# Patient Record
Sex: MALE | Race: WHITE | NOT HISPANIC OR LATINO | Employment: OTHER | URBAN - METROPOLITAN AREA
[De-identification: names, ages, dates, MRNs, and addresses within clinical notes are randomized per-mention and may not be internally consistent; named-entity substitution may affect disease eponyms.]

---

## 2023-07-13 ENCOUNTER — OFFICE VISIT (OUTPATIENT)
Dept: PODIATRY | Facility: CLINIC | Age: 68
End: 2023-07-13
Payer: MEDICARE

## 2023-07-13 VITALS
SYSTOLIC BLOOD PRESSURE: 138 MMHG | HEIGHT: 70 IN | WEIGHT: 300.19 LBS | HEART RATE: 82 BPM | BODY MASS INDEX: 42.97 KG/M2 | DIASTOLIC BLOOD PRESSURE: 83 MMHG

## 2023-07-13 DIAGNOSIS — M79.672 LEFT FOOT PAIN: ICD-10-CM

## 2023-07-13 DIAGNOSIS — R26.2 DIFFICULTY WALKING: ICD-10-CM

## 2023-07-13 DIAGNOSIS — L84 CORN OR CALLUS: ICD-10-CM

## 2023-07-13 DIAGNOSIS — L97.521 ULCER OF LEFT FOOT, LIMITED TO BREAKDOWN OF SKIN: Primary | ICD-10-CM

## 2023-07-13 PROCEDURE — 99203 OFFICE O/P NEW LOW 30 MIN: CPT | Mod: 25,S$GLB,, | Performed by: PODIATRIST

## 2023-07-13 PROCEDURE — 99203 PR OFFICE/OUTPT VISIT, NEW, LEVL III, 30-44 MIN: ICD-10-PCS | Mod: 25,S$GLB,, | Performed by: PODIATRIST

## 2023-07-13 PROCEDURE — 97597 WOUND DEBRIDEMENT: ICD-10-PCS | Mod: S$GLB,,, | Performed by: PODIATRIST

## 2023-07-13 PROCEDURE — 97597 DBRDMT OPN WND 1ST 20 CM/<: CPT | Mod: S$GLB,,, | Performed by: PODIATRIST

## 2023-07-13 RX ORDER — IRBESARTAN AND HYDROCHLOROTHIAZIDE 150; 12.5 MG/1; MG/1
1 TABLET, FILM COATED ORAL DAILY
COMMUNITY

## 2023-07-13 RX ORDER — LEVOTHYROXINE SODIUM 150 UG/1
150 TABLET ORAL
COMMUNITY

## 2023-07-13 RX ORDER — VIT C/E/ZN/COPPR/LUTEIN/ZEAXAN 250MG-90MG
CAPSULE ORAL
COMMUNITY

## 2023-07-13 RX ORDER — LOSARTAN POTASSIUM AND HYDROCHLOROTHIAZIDE 12.5; 5 MG/1; MG/1
1 TABLET ORAL DAILY
COMMUNITY

## 2023-07-13 RX ORDER — OMEPRAZOLE 40 MG/1
1 CAPSULE, DELAYED RELEASE ORAL EVERY MORNING
COMMUNITY
Start: 2023-06-12

## 2023-07-19 NOTE — PROGRESS NOTES
Subjective:     Patient ID: Julian Enciso is a 68 y.o. male.    Chief Complaint: Foot Ulcer    Julian is a 68 y.o. male who presents to the clinic for evaluation and treatment of high risk feet. Julian has a past medical history of Hypertension, Hypothyroidism, and Thyroid disease. The patient's chief complaint is foot ulcer, left 1st digit.         Current shoe gear:  Affected Foot: Slip-on shoes     Unaffected Foot: Slip-on shoes    History of Trauma: negative  Sign of Infection: none    No results found for: HGBA1C    Review of Systems   Constitutional: Negative for chills and fever.   Cardiovascular:  Negative for chest pain and leg swelling.   Respiratory:  Negative for cough and shortness of breath.    Gastrointestinal:  Negative for diarrhea, nausea and vomiting.      Objective:     Physical Exam  Vitals reviewed.   Constitutional:       General: He is not in acute distress.     Appearance: Normal appearance. He is not ill-appearing.   HENT:      Head: Normocephalic.      Nose: Nose normal.   Cardiovascular:      Rate and Rhythm: Normal rate.   Pulmonary:      Effort: Pulmonary effort is normal. No respiratory distress.   Skin:     Capillary Refill: Capillary refill takes 2 to 3 seconds.   Neurological:      Mental Status: He is alert and oriented to person, place, and time.   Psychiatric:         Mood and Affect: Mood normal.         Behavior: Behavior normal.         Thought Content: Thought content normal.     Dermatologic:  Ulceration noted to left 1st digit/ MPJ measuring 2.0 x 2.0 x 0.1 cm as evidence of maceration and hyperkeratotic perimeter but no signs of infection, preulcerative hyperkeratotic skin lesion noted to the right 1st MPJ and IPJ, no rashes noted bilateral foot, no interdigital maceration noted bilateral foot   Vascular: DP and PT pulses palpable 1/4 bilateral foot, capillary fill time less than 3 seconds to distal digits bilateral foot   Musculoskeletal:  5/5 muscle strength noted  bilateral foot, ankle joint range of motion is reduced without pain, pain and tenderness with palpation of the left 1st MPJ ulceration  Neurologic:  Protective and  light touch sensation intact bilateral lower extremity    Assessment:      Encounter Diagnoses   Name Primary?    Ulcer of left foot, limited to breakdown of skin Yes    Corn or callus     Left foot pain     Difficulty walking      Plan:     Julian was seen today for foot ulcer.    Diagnoses and all orders for this visit:    Ulcer of left foot, limited to breakdown of skin  -     Wound Debridement    Corn or callus    Left foot pain    Difficulty walking      I counseled the patient on his conditions, their implications and medical management.        1. Patient was examined and evaluated.    2. Discussed with patient etiology of ulcer formation.  Patient was advised to continue with daily wound care to the ulcer.  Patient will decrease the amount of moisture to the left foot.  Patient was dispensed Betadine for cleansing and painting of the area.  Patient was advised to proceed with a dry sterile bandage on a daily basis.  Wound Debridement    Date/Time: 7/13/2023 1:00 PM  Performed by: Viry Anthony DPM  Authorized by: Viry Anthony DPM     Consent Done?:  Yes (Verbal)    Wound Details:    Location:  Left foot    Location:  Left 1st Metatarsal Head       Length (cm):  2       Area (sq cm):  4       Width (cm):  2       Percent Debrided (%):  100       Depth (cm):  0.1       Total Area Debrided (sq cm):  4    Depth of debridement:  Epidermis/Dermis    Devitalized tissue debrided:  Biofilm and Callus    Instruments:  Blade  Bleeding:  Minimal  Hemostasis Achieved: Yes  Method Used:  Pressure  Patient tolerance:  Patient tolerated the procedure well with no immediate complications  1st Wound Pain Assessment: 2    3. Discussed with patient etiology of callus formation.  Patient was advised to decrease the amount of barefoot walking and limit  use of flip-flops.  Patient was advised to perform safe debridement at home with a nail file, emery board, or pumice stone.  Patient was advised to apply ointment/moisturizer to callus areas only  4. Patient was advised to report any constitutional symptoms to his primary care physician, local urgent care center, or [facility].    5. Patient will follow-up in 1 week or p.r.n. for complaints

## 2023-07-21 ENCOUNTER — OFFICE VISIT (OUTPATIENT)
Dept: PODIATRY | Facility: CLINIC | Age: 68
End: 2023-07-21
Payer: MEDICARE

## 2023-07-21 VITALS
DIASTOLIC BLOOD PRESSURE: 84 MMHG | WEIGHT: 300 LBS | HEIGHT: 70 IN | HEART RATE: 64 BPM | BODY MASS INDEX: 42.95 KG/M2 | SYSTOLIC BLOOD PRESSURE: 142 MMHG

## 2023-07-21 DIAGNOSIS — M79.672 LEFT FOOT PAIN: ICD-10-CM

## 2023-07-21 DIAGNOSIS — L97.521 ULCER OF LEFT FOOT, LIMITED TO BREAKDOWN OF SKIN: Primary | ICD-10-CM

## 2023-07-21 DIAGNOSIS — R26.2 DIFFICULTY WALKING: ICD-10-CM

## 2023-07-21 PROCEDURE — 99499 NO LOS: ICD-10-PCS | Mod: S$GLB,,, | Performed by: PODIATRIST

## 2023-07-21 PROCEDURE — 97597 DBRDMT OPN WND 1ST 20 CM/<: CPT | Mod: S$GLB,,, | Performed by: PODIATRIST

## 2023-07-21 PROCEDURE — 99499 UNLISTED E&M SERVICE: CPT | Mod: S$GLB,,, | Performed by: PODIATRIST

## 2023-07-21 PROCEDURE — 97597 WOUND DEBRIDEMENT: ICD-10-PCS | Mod: S$GLB,,, | Performed by: PODIATRIST

## 2023-07-21 RX ORDER — ERYTHROMYCIN 5 MG/G
OINTMENT OPHTHALMIC NIGHTLY
COMMUNITY
Start: 2023-04-12

## 2023-07-21 NOTE — PROGRESS NOTES
Subjective:     Patient ID: Julian Enciso is a 68 y.o. male.    Chief Complaint: Foot Ulcer    Julian is a 68 y.o. male who presents to the clinic for evaluation and treatment of high risk feet. Julian has a past medical history of Hypertension, Hypothyroidism, and Thyroid disease. The patient's chief complaint is foot ulcer, left 1st MPJ  .     PCP: Primary Doctor No        Current shoe gear:  Affected Foot: Tennis shoes     Unaffected Foot: Tennis shoes    History of Trauma: negative  Sign of Infection: none    No results found for: HGBA1C    Review of Systems   Constitutional: Negative for chills and fever.   Cardiovascular:  Negative for chest pain and leg swelling.   Respiratory:  Negative for cough and shortness of breath.    Gastrointestinal:  Negative for diarrhea, nausea and vomiting.      Objective:     Physical Exam  Vitals reviewed.   Constitutional:       General: He is not in acute distress.     Appearance: Normal appearance. He is not ill-appearing.   HENT:      Head: Normocephalic.      Nose: Nose normal.   Cardiovascular:      Rate and Rhythm: Normal rate.   Pulmonary:      Effort: Pulmonary effort is normal. No respiratory distress.   Skin:     Capillary Refill: Capillary refill takes 2 to 3 seconds.   Neurological:      Mental Status: He is alert and oriented to person, place, and time.   Psychiatric:         Mood and Affect: Mood normal.         Behavior: Behavior normal.         Thought Content: Thought content normal.        Dermatologic:  Ulceration noted to left 1st digit/ MPJ measuring 2.0 x 2.0 x 0.1 cm as evidence of maceration and hyperkeratotic perimeter but no signs of infection, preulcerative hyperkeratotic skin lesion noted to the right 1st MPJ and IPJ, no rashes noted bilateral foot, no interdigital maceration noted bilateral foot   Vascular: DP and PT pulses palpable 1/4 bilateral foot, capillary fill time less than 3 seconds to distal digits bilateral foot   Musculoskeletal:   5/5 muscle strength noted bilateral foot, ankle joint range of motion is reduced without pain, pain and tenderness with palpation of the left 1st MPJ ulceration  Neurologic:  Protective and  light touch sensation intact bilateral lower extremity    Assessment:      Encounter Diagnoses   Name Primary?    Ulcer of left foot, limited to breakdown of skin Yes    Left foot pain     Difficulty walking      Plan:     Julian was seen today for foot ulcer.    Diagnoses and all orders for this visit:    Ulcer of left foot, limited to breakdown of skin  -     Wound Debridement    Left foot pain  -     Wound Debridement    Difficulty walking  -     Wound Debridement      I counseled the patient on his conditions, their implications and medical management.        1. Patient was examined and evaluated.    2. Discussed with patient etiology of ulcer formation.  Patient was advised to continue with daily wound care to the ulcer.  Patient will decrease the amount of moisture to the left foot.  Patient was dispensed Betadine for cleansing and painting of the area.  Patient was advised to proceed with a dry sterile bandage on a daily basis.  Wound Debridement    Date/Time: 7/21/2023 11:15 AM  Performed by: Viry Anthony DPM  Authorized by: Viry Anthony DPM     Consent Done?:  Yes (Verbal)    Wound Details:    Location:  Left foot    Location:  Left 1st Metatarsal Head       Length (cm):  2       Area (sq cm):  4       Width (cm):  2       Percent Debrided (%):  100       Depth (cm):  0.1       Total Area Debrided (sq cm):  4    Depth of debridement:  Epidermis/Dermis    Devitalized tissue debrided:  Biofilm, Callus and Slough    Instruments:  Blade  Bleeding:  Moderate  Hemostasis Achieved: Yes  Method Used:  Pressure  Patient tolerance:  Patient tolerated the procedure well with no immediate complications  1st Wound Pain Assessment: 0    3. Discussed with patient etiology of callus formation.  Patient was advised to  decrease the amount of barefoot walking and limit use of flip-flops.  Patient was advised to perform safe debridement at home with a nail file, emery board, or pumice stone.  Patient was advised to apply ointment/moisturizer to callus areas only  4. Patient was advised to report any constitutional symptoms to his primary care physician, local urgent care center, or [facility].    5. Patient will follow-up in 1 week or p.r.n. for complaints

## 2023-07-28 ENCOUNTER — OFFICE VISIT (OUTPATIENT)
Dept: PODIATRY | Facility: CLINIC | Age: 68
End: 2023-07-28
Payer: MEDICARE

## 2023-07-28 VITALS
SYSTOLIC BLOOD PRESSURE: 157 MMHG | BODY MASS INDEX: 42.95 KG/M2 | WEIGHT: 300 LBS | HEART RATE: 61 BPM | DIASTOLIC BLOOD PRESSURE: 87 MMHG | HEIGHT: 70 IN

## 2023-07-28 DIAGNOSIS — R26.2 DIFFICULTY WALKING: ICD-10-CM

## 2023-07-28 DIAGNOSIS — L97.521 ULCER OF LEFT FOOT, LIMITED TO BREAKDOWN OF SKIN: Primary | ICD-10-CM

## 2023-07-28 DIAGNOSIS — L84 CORN OR CALLUS: ICD-10-CM

## 2023-07-28 DIAGNOSIS — M79.672 LEFT FOOT PAIN: ICD-10-CM

## 2023-07-28 PROCEDURE — 99213 PR OFFICE/OUTPT VISIT, EST, LEVL III, 20-29 MIN: ICD-10-PCS | Mod: 25,S$GLB,, | Performed by: PODIATRIST

## 2023-07-28 PROCEDURE — 99213 OFFICE O/P EST LOW 20 MIN: CPT | Mod: 25,S$GLB,, | Performed by: PODIATRIST

## 2023-07-28 PROCEDURE — 97597 DBRDMT OPN WND 1ST 20 CM/<: CPT | Mod: S$GLB,,, | Performed by: PODIATRIST

## 2023-07-28 PROCEDURE — 97597 WOUND DEBRIDEMENT: ICD-10-PCS | Mod: S$GLB,,, | Performed by: PODIATRIST

## 2023-07-28 RX ORDER — LOSARTAN POTASSIUM AND HYDROCHLOROTHIAZIDE 12.5; 1 MG/1; MG/1
1 TABLET ORAL
COMMUNITY
Start: 2023-07-27

## 2023-07-28 NOTE — PROGRESS NOTES
Subjective:     Patient ID: Julian Enciso is a 68 y.o. male.    Chief Complaint: Foot Ulcer    Julian is a 68 y.o. male who presents to the clinic for evaluation and treatment of high risk feet. Julian has a past medical history of Hypertension, Hypothyroidism, and Thyroid disease. The patient's chief complaint is foot ulcer, left 1st mPJ.     PCP: Primary Doctor No        Current shoe gear:  Affected Foot: Tennis shoes     Unaffected Foot: Tennis shoes    History of Trauma: negative  Sign of Infection: none    No results found for: HGBA1C    Review of Systems   Constitutional: Negative for chills and fever.   Cardiovascular:  Negative for chest pain and leg swelling.   Respiratory:  Negative for cough and shortness of breath.    Gastrointestinal:  Negative for diarrhea, nausea and vomiting.      Objective:     Physical Exam  Vitals reviewed.   Constitutional:       General: He is not in acute distress.     Appearance: Normal appearance. He is not ill-appearing.   HENT:      Head: Normocephalic.      Nose: Nose normal.   Cardiovascular:      Rate and Rhythm: Normal rate.   Pulmonary:      Effort: Pulmonary effort is normal. No respiratory distress.   Skin:     Capillary Refill: Capillary refill takes 2 to 3 seconds.   Neurological:      Mental Status: He is alert and oriented to person, place, and time.   Psychiatric:         Mood and Affect: Mood normal.         Behavior: Behavior normal.         Thought Content: Thought content normal.     Dermatologic:  Ulceration noted to left 1st digit/ MPJ measuring 2.5 x 2.0 x 0.1 cm with evidence of maceration and hyperkeratotic perimeter medial skin margins seem to exhibit wart like chareacteristics with noted pinpoint bleeding but no signs of infection,  no rashes noted bilateral foot, no interdigital maceration noted bilateral foot   Vascular: DP and PT pulses palpable 1/4 bilateral foot, capillary fill time less than 3 seconds to distal digits bilateral foot    Musculoskeletal:  5/5 muscle strength noted bilateral foot, ankle joint range of motion is reduced without pain, pain and tenderness with palpation of the left 1st MPJ ulceration  Neurologic:  Protective and  light touch sensation intact bilateral lower extremity    Assessment:      Encounter Diagnoses   Name Primary?    Ulcer of left foot, limited to breakdown of skin Yes    Left foot pain     Difficulty walking     Corn or callus      Plan:     Julian was seen today for foot ulcer.    Diagnoses and all orders for this visit:    Ulcer of left foot, limited to breakdown of skin  -     Wound Debridement    Left foot pain    Difficulty walking    Corn or callus      I counseled the patient on his conditions, their implications and medical management.        1. Patient was examined and evaluated.    2. Discussed with patient etiology of ulcer formation.  Patient was advised to continue with daily wound care to the ulcer.  Patient will decrease the amount of moisture to the left foot.  Patient was dispensed Betadine for cleansing and painting of the area.  Patient was advised to proceed with a dry sterile bandage on a daily basis.  Wound Debridement    Date/Time: 7/28/2023 8:45 AM  Performed by: Viry Anthony DPM  Authorized by: Viry Anthony DPM     Consent Done?:  Yes (Verbal)    Wound Details:    Location:  Left foot    Location:  Left 1st Metatarsal Head       Length (cm):  2.5       Area (sq cm):  5       Width (cm):  2       Percent Debrided (%):  100       Depth (cm):  0.1       Total Area Debrided (sq cm):  5    Depth of debridement:  Epidermis/Dermis    Devitalized tissue debrided:  Biofilm and Callus    Instruments:  Blade  Bleeding:  Minimal  Hemostasis Achieved: Yes  Method Used:  Pressure  Patient tolerance:  Patient tolerated the procedure well with no immediate complications  1st Wound Pain Assessment: 2    3. Discussed with patient etiology of callus formation.  Patient was advised to  decrease the amount of barefoot walking and limit use of flip-flops.  Patient was advised to perform safe debridement at home with a nail file, emery board, or pumice stone.  Patient was advised to apply ointment/moisturizer to callus areas only.  Discussed with patient warty like appearance of wound at this point.  Discussed potential cryotherapy at the next office visit to that portion of the wound.  4. Patient was advised to report any constitutional symptoms to his primary care physician, local urgent care center, or [facility].    5. Patient will follow-up in 1 week or p.r.n. for complaints

## 2023-08-04 ENCOUNTER — OFFICE VISIT (OUTPATIENT)
Dept: PODIATRY | Facility: CLINIC | Age: 68
End: 2023-08-04
Payer: MEDICARE

## 2023-08-04 VITALS
HEART RATE: 63 BPM | BODY MASS INDEX: 42.95 KG/M2 | DIASTOLIC BLOOD PRESSURE: 76 MMHG | SYSTOLIC BLOOD PRESSURE: 137 MMHG | HEIGHT: 70 IN | WEIGHT: 300 LBS

## 2023-08-04 DIAGNOSIS — L97.521 ULCER OF LEFT FOOT, LIMITED TO BREAKDOWN OF SKIN: ICD-10-CM

## 2023-08-04 DIAGNOSIS — B07.0 PLANTAR WART: Primary | ICD-10-CM

## 2023-08-04 DIAGNOSIS — M79.672 LEFT FOOT PAIN: ICD-10-CM

## 2023-08-04 DIAGNOSIS — R26.2 DIFFICULTY WALKING: ICD-10-CM

## 2023-08-04 PROCEDURE — 99212 PR OFFICE/OUTPT VISIT, EST, LEVL II, 10-19 MIN: ICD-10-PCS | Mod: 25,S$GLB,, | Performed by: PODIATRIST

## 2023-08-04 PROCEDURE — 17110 DESTRUCTION B9 LES UP TO 14: CPT | Mod: S$GLB,,, | Performed by: PODIATRIST

## 2023-08-04 PROCEDURE — 17110 DESTRUCTION OF BENIGN LESION: ICD-10-PCS | Mod: S$GLB,,, | Performed by: PODIATRIST

## 2023-08-04 PROCEDURE — 99212 OFFICE O/P EST SF 10 MIN: CPT | Mod: 25,S$GLB,, | Performed by: PODIATRIST

## 2023-08-14 ENCOUNTER — OFFICE VISIT (OUTPATIENT)
Dept: PODIATRY | Facility: CLINIC | Age: 68
End: 2023-08-14
Payer: MEDICARE

## 2023-08-14 VITALS — WEIGHT: 300 LBS | HEIGHT: 70 IN | BODY MASS INDEX: 42.95 KG/M2

## 2023-08-14 DIAGNOSIS — B07.0 PLANTAR WART: ICD-10-CM

## 2023-08-14 DIAGNOSIS — L03.032 CELLULITIS OF TOE OF LEFT FOOT: Primary | ICD-10-CM

## 2023-08-14 DIAGNOSIS — L97.521 ULCER OF LEFT FOOT, LIMITED TO BREAKDOWN OF SKIN: ICD-10-CM

## 2023-08-14 PROCEDURE — 99213 PR OFFICE/OUTPT VISIT, EST, LEVL III, 20-29 MIN: ICD-10-PCS | Mod: 24,25,S$GLB, | Performed by: PODIATRIST

## 2023-08-14 PROCEDURE — 97597 WOUND DEBRIDEMENT: ICD-10-PCS | Mod: 79,S$GLB,, | Performed by: PODIATRIST

## 2023-08-14 PROCEDURE — 97597 DBRDMT OPN WND 1ST 20 CM/<: CPT | Mod: 79,S$GLB,, | Performed by: PODIATRIST

## 2023-08-14 PROCEDURE — 99213 OFFICE O/P EST LOW 20 MIN: CPT | Mod: 24,25,S$GLB, | Performed by: PODIATRIST

## 2023-08-14 RX ORDER — SULFAMETHOXAZOLE AND TRIMETHOPRIM 400; 80 MG/1; MG/1
1 TABLET ORAL 2 TIMES DAILY
Qty: 28 TABLET | Refills: 0 | Status: SHIPPED | OUTPATIENT
Start: 2023-08-14 | End: 2023-08-28

## 2023-08-16 NOTE — PROGRESS NOTES
"Subjective:     Patient ID: Julian Enciso is a 68 y.o. male.    Chief Complaint: Foot Ulcer    Julian is a 68 y.o. male who presents to the clinic for evaluation and treatment of high risk feet. Julian has a past medical history of Hypertension, Hypothyroidism, and Thyroid disease. The patient's chief complaint is foot ulcer/ mass, left 1st MPJ medially.         Current shoe gear:  Affected Foot: Tennis shoes     Unaffected Foot: Tennis shoes    History of Trauma: negative  Sign of Infection: none    No results found for: "HGBA1C"    Review of Systems   Constitutional: Negative for chills and fever.   Cardiovascular:  Negative for chest pain and leg swelling.   Respiratory:  Negative for cough and shortness of breath.    Gastrointestinal:  Negative for diarrhea, nausea and vomiting.        Objective:     Physical Exam  Vitals reviewed.   Constitutional:       General: He is not in acute distress.     Appearance: Normal appearance. He is not ill-appearing.   HENT:      Head: Normocephalic.      Nose: Nose normal.   Cardiovascular:      Rate and Rhythm: Normal rate.   Pulmonary:      Effort: Pulmonary effort is normal. No respiratory distress.   Skin:     Capillary Refill: Capillary refill takes 2 to 3 seconds.   Neurological:      Mental Status: He is alert and oriented to person, place, and time.   Psychiatric:         Mood and Affect: Mood normal.         Behavior: Behavior normal.         Thought Content: Thought content normal.     Dermatologic:  soft tissue mass/ Ulceration noted to left 1st digit/ MPJ measuring 2.3 x 2.0 x 0.1 cm with evidence of maceration and hyperkeratotic perimeter, dorsal medial skin margins seem to exhibit wart like chareacteristics with noted pinpoint bleeding but no signs of infection,  no rashes noted bilateral foot, no interdigital maceration noted bilateral foot   Vascular: DP and PT pulses palpable 1/4 bilateral foot, capillary fill time less than 3 seconds to distal digits " bilateral foot   Musculoskeletal:  5/5 muscle strength noted bilateral foot, ankle joint range of motion is reduced without pain, pain and tenderness with palpation of the left 1st MPJ ulceration  Neurologic:  Protective and  light touch sensation intact bilateral lower extremity      Assessment:      Encounter Diagnoses   Name Primary?    Ulcer of left foot, limited to breakdown of skin     Left foot pain     Difficulty walking     Plantar wart Yes     Plan:     Julian was seen today for foot ulcer.    Diagnoses and all orders for this visit:    Plantar wart  -     Destruction of Benign Lesion    Ulcer of left foot, limited to breakdown of skin    Left foot pain    Difficulty walking      I counseled the patient on his conditions, their implications and medical management.        1. Patient was examined and evaluated.    2. Patient made aware of continued presence of wound/ verrucae to the left 1st digit.  Patient will continue with daily dressing changes with application of Betadine paint and a dry sterile bandage as well as offloading pad.  Destruction of Benign Lesion    Date/Time: 8/4/2023 8:45 AM    Performed by: Viry Anthony DPM  Authorized by: Viry Anthony DPM    Consent Done?:  Yes (Verbal)  Location:     Lower Extremity:  Toe    Detail:  Left big toe  Prep:     Preparation: Patient was prepped and draped in usual sterile fashion    Procedure Details:     Cosmetic?: No      Number of lesions:  1    Destruction method:  Cryotherapy    Bleeding:  Minimal    Hemostasis Achieved: Yes      Method Used:  Pressure     Patient tolerated the procedure well with no immediate complications.     Post-operative instructions were provided for the patient.     Patient was discharged and will follow up for wound check.      3. Discussed with patient potential verrucous component to the lesion.  Patient was made aware that we may attempt biopsy of the lesion at next appointment.  4. Patient was advised  adjunct OTC analgesics if pain complaints worsen over time   5. Patient will follow-up in 2 weeks p.r.n. for complaints

## 2023-08-18 ENCOUNTER — OFFICE VISIT (OUTPATIENT)
Dept: PODIATRY | Facility: CLINIC | Age: 68
End: 2023-08-18
Payer: MEDICARE

## 2023-08-18 VITALS
BODY MASS INDEX: 42.95 KG/M2 | HEART RATE: 58 BPM | HEIGHT: 70 IN | SYSTOLIC BLOOD PRESSURE: 143 MMHG | WEIGHT: 300 LBS | DIASTOLIC BLOOD PRESSURE: 79 MMHG

## 2023-08-18 DIAGNOSIS — M79.672 LEFT FOOT PAIN: ICD-10-CM

## 2023-08-18 DIAGNOSIS — B07.0 PLANTAR WART: Primary | ICD-10-CM

## 2023-08-18 DIAGNOSIS — R26.2 DIFFICULTY WALKING: ICD-10-CM

## 2023-08-18 PROCEDURE — 99212 PR OFFICE/OUTPT VISIT, EST, LEVL II, 10-19 MIN: ICD-10-PCS | Mod: S$GLB,,, | Performed by: PODIATRIST

## 2023-08-18 PROCEDURE — 99212 OFFICE O/P EST SF 10 MIN: CPT | Mod: S$GLB,,, | Performed by: PODIATRIST

## 2023-08-24 ENCOUNTER — OFFICE VISIT (OUTPATIENT)
Dept: PODIATRY | Facility: CLINIC | Age: 68
End: 2023-08-24
Payer: MEDICARE

## 2023-08-24 VITALS — BODY MASS INDEX: 42.95 KG/M2 | HEIGHT: 70 IN | WEIGHT: 300 LBS

## 2023-08-24 DIAGNOSIS — B07.0 PLANTAR WART: ICD-10-CM

## 2023-08-24 DIAGNOSIS — L97.521 ULCER OF LEFT FOOT, LIMITED TO BREAKDOWN OF SKIN: Primary | ICD-10-CM

## 2023-08-24 PROCEDURE — 97597 WOUND DEBRIDEMENT: ICD-10-PCS | Mod: S$GLB,,, | Performed by: PODIATRIST

## 2023-08-24 PROCEDURE — 99499 UNLISTED E&M SERVICE: CPT | Mod: S$GLB,,, | Performed by: PODIATRIST

## 2023-08-24 PROCEDURE — 97597 DBRDMT OPN WND 1ST 20 CM/<: CPT | Mod: S$GLB,,, | Performed by: PODIATRIST

## 2023-08-24 PROCEDURE — 99499 NO LOS: ICD-10-PCS | Mod: S$GLB,,, | Performed by: PODIATRIST

## 2023-08-24 RX ORDER — SALICYLIC ACID
POWDER (GRAM) MISCELLANEOUS
COMMUNITY
Start: 2023-08-18

## 2023-08-27 NOTE — PROGRESS NOTES
Subjective:     Patient ID: Julian Enciso is a 68 y.o. male.    Chief Complaint: Foot Ulcer (Swelling and pain in area as well as leakage)    Julian is a 68 y.o. male who presents to the podiatry clinic  with complaint of  left foot pain. Onset of the symptoms was several days ago. Precipitating event: none known. Current symptoms include: ability to bear weight, but with some pain, redness, swelling, and worsening symptoms after a period of activity. Aggravating factors: any weight bearing. Symptoms have gradually worsened. Patient has had prior foot problems. Treatment to date:  local wound care with debridement of lesion . Patients rates pain 5/10 on pain scale.    Review of Systems   Constitutional: Negative for chills and fever.   Cardiovascular:  Negative for chest pain and leg swelling.   Respiratory:  Negative for cough and shortness of breath.    Gastrointestinal:  Negative for diarrhea, nausea and vomiting.        Objective:     Physical Exam  Vitals reviewed.   Constitutional:       General: He is not in acute distress.     Appearance: Normal appearance. He is not ill-appearing.   HENT:      Head: Normocephalic.      Nose: Nose normal.   Pulmonary:      Effort: Pulmonary effort is normal. No respiratory distress.   Skin:     Capillary Refill: Capillary refill takes 2 to 3 seconds.   Neurological:      Mental Status: He is alert and oriented to person, place, and time.   Psychiatric:         Mood and Affect: Mood normal.         Behavior: Behavior normal.         Thought Content: Thought content normal.       Dermatologic:  soft tissue mass/ Ulceration noted to left 1st digit/ MPJ measuring 2.0 x 2.0 x 0.1 cm with evidence of maceration and hyperkeratotic perimeter with erythema and mild proximal streaking noted towards 1st MPJ, dorsal medial skin margins seem to exhibit wart like chareacteristics with noted pinpoint bleeding but no signs of infection,  no rashes noted bilateral foot, no interdigital  maceration noted bilateral foot   Vascular: DP and PT pulses palpable 1/4 bilateral foot, capillary fill time less than 3 seconds to distal digits bilateral foot   Musculoskeletal:  5/5 muscle strength noted bilateral foot, ankle joint range of motion is reduced without pain, pain and tenderness with palpation of the left 1st MPJ ulceration  Neurologic:  Protective and  light touch sensation intact bilateral lower extremity    Assessment:      Encounter Diagnoses   Name Primary?    Cellulitis of toe of left foot Yes    Plantar wart      Plan:     Julian was seen today for foot ulcer.    Diagnoses and all orders for this visit:    Cellulitis of toe of left foot  -     sulfamethoxazole-trimethoprim 400-80mg (BACTRIM,SEPTRA) 400-80 mg per tablet; Take 1 tablet by mouth 2 (two) times daily. for 14 days    Plantar wart      I counseled the patient on his conditions, their implications and medical management.        1. Patient was examined and evaluated.    2. Discussed with patient etiology of cellulitis to the left foot.  Patient will take oral Bactrim to completion for improvement of symptoms.    3. Discussed with patient mixed appearance of soft tissue lesion.  Patient was made aware that the lesion perhaps could be a normal ulcer or has physical properties of a plantar verruca.  A biopsy was taken and sent to lab for further analysis today.  Patient had Betadine painted to the wound today and a dry sterile bandage applied after debridement and biopsy performed without incidence.  Wound Debridement    Date/Time: 8/14/2023 9:15 AM    Performed by: Viry Anthony DPM  Authorized by: Viry Anthony DPM    Consent Done?:  Yes (Verbal)    Wound Details:    Location:  Left foot    Location:  Left 1st Metatarsal Head       Length (cm):  2       Area (sq cm):  4       Width (cm):  2       Percent Debrided (%):  100       Depth (cm):  0.1       Total Area Debrided (sq cm):  4    Depth of debridement:   Epidermis/Dermis    Devitalized tissue debrided:  Biofilm and Callus    Instruments:  Blade  Bleeding:  Moderate  Hemostasis Achieved: Yes  Method Used:  Pressure  Patient tolerance:  Patient tolerated the procedure well with no immediate complications  Specimen Collected: Specimen sent to pathology      4. Patient will continue with daily dressing changes to the left 1st digit soft tissue lesion with use of offloading pad and Betadine wet-to-dry dressing.    5. Patient will continue with comfortable shoe gear both inside and outside the home.    6. Patient will follow-up in 4 days p.r.n. for complaints

## 2023-08-29 ENCOUNTER — OFFICE VISIT (OUTPATIENT)
Dept: PODIATRY | Facility: CLINIC | Age: 68
End: 2023-08-29
Payer: MEDICARE

## 2023-08-29 VITALS — HEIGHT: 70 IN | WEIGHT: 300 LBS | BODY MASS INDEX: 42.95 KG/M2

## 2023-08-29 DIAGNOSIS — B07.0 PLANTAR WART: ICD-10-CM

## 2023-08-29 DIAGNOSIS — L97.521 ULCER OF LEFT FOOT, LIMITED TO BREAKDOWN OF SKIN: Primary | ICD-10-CM

## 2023-08-29 DIAGNOSIS — M79.672 LEFT FOOT PAIN: ICD-10-CM

## 2023-08-29 PROCEDURE — 99499 UNLISTED E&M SERVICE: CPT | Mod: S$GLB,,, | Performed by: PODIATRIST

## 2023-08-29 PROCEDURE — 97597 WOUND DEBRIDEMENT: ICD-10-PCS | Mod: S$GLB,,, | Performed by: PODIATRIST

## 2023-08-29 PROCEDURE — 97597 DBRDMT OPN WND 1ST 20 CM/<: CPT | Mod: S$GLB,,, | Performed by: PODIATRIST

## 2023-08-29 PROCEDURE — 99499 NO LOS: ICD-10-PCS | Mod: S$GLB,,, | Performed by: PODIATRIST

## 2023-08-30 NOTE — PROGRESS NOTES
Subjective:     Patient ID: Julian Enciso is a 68 y.o. male.    Chief Complaint: Foot Ulcer    Julian is a 68 y.o. male who presents to the podiatry clinic  with complaint of  left foot pain. Onset of the symptoms was several months ago. Precipitating event:  growth of painful soft tissue lesion/ mass . Current symptoms include: ability to bear weight, but with some pain, redness, and worsening symptoms after a period of activity. Aggravating factors:  barefoot walking and prolonged use . Symptoms have waxed and waned but are better overall. Patient has had prior foot problems. Treatment to date:  routine debridement and biopsy of lesion . Patients rates pain 3/10 on pain scale.    Review of Systems   Constitutional: Negative for chills and fever.   Cardiovascular:  Negative for chest pain and leg swelling.   Respiratory:  Negative for cough and shortness of breath.    Gastrointestinal:  Negative for diarrhea, nausea and vomiting.        Objective:     Physical Exam  Vitals reviewed.   Constitutional:       General: He is not in acute distress.     Appearance: Normal appearance. He is not ill-appearing.   HENT:      Head: Normocephalic.      Nose: Nose normal.   Cardiovascular:      Rate and Rhythm: Normal rate.   Pulmonary:      Effort: Pulmonary effort is normal. No respiratory distress.   Skin:     Capillary Refill: Capillary refill takes 2 to 3 seconds.   Neurological:      Mental Status: He is alert and oriented to person, place, and time.   Psychiatric:         Mood and Affect: Mood normal.         Behavior: Behavior normal.         Thought Content: Thought content normal.     Dermatologic:  soft tissue mass/ Ulceration noted to left 1st digit/ MPJ measuring 2.3 x 2.0 x 0.1 cm with hyperkeratotic perimeter, dorsal medial skin margins seem to exhibit wart like chareacteristics with noted pinpoint bleeding but no signs of infection,  no rashes noted bilateral foot, no interdigital maceration noted  bilateral foot   Vascular: DP and PT pulses palpable 1/4 bilateral foot, capillary fill time less than 3 seconds to distal digits bilateral foot   Musculoskeletal:  5/5 muscle strength noted bilateral foot, ankle joint range of motion is reduced without pain, pain and tenderness with palpation of the left 1st MPJ ulceration  Neurologic:  Protective and  light touch sensation intact bilateral lower extremity      Assessment:      Encounter Diagnoses   Name Primary?    Plantar wart Yes    Left foot pain     Difficulty walking      Plan:     Julian was seen today for foot ulcer.    Diagnoses and all orders for this visit:    Plantar wart    Left foot pain    Difficulty walking      I counseled the patient on his conditions, their implications and medical management.        1. Patient was examined and evaluated.    2. Discussed with patient biopsy results from [facility] from left 1st digit.  Patient was made aware that analysis revealed the lesion to be a plantar verruca.  Patient was dispensed a prescription for topical salicylic acid to be obtained from starting [facility] locally.  Patient will begin topical treatment at home.  Biopsy results were uploaded and placed in patient's chart.    3. Patient was dispensed offloading pads for prevention of direct pressure to lesion.  4. Discussed with patient potential for recurrence of verruca over time.  Patient was made aware that pain seems to be related to direct contact and the location of the lesion.  Patient was advised adjunct with OTC NSAIDs for pain relief  5. Patient will decrease the amount of barefoot walking continue with comfortable shoe gear both inside and outside the home.    6. Patient will follow-up in 1 week or p.r.n. for complaints

## 2023-09-07 ENCOUNTER — OFFICE VISIT (OUTPATIENT)
Dept: PODIATRY | Facility: CLINIC | Age: 68
End: 2023-09-07
Payer: MEDICARE

## 2023-09-07 VITALS
WEIGHT: 300 LBS | BODY MASS INDEX: 42.95 KG/M2 | HEART RATE: 76 BPM | SYSTOLIC BLOOD PRESSURE: 134 MMHG | DIASTOLIC BLOOD PRESSURE: 81 MMHG | HEIGHT: 70 IN

## 2023-09-07 DIAGNOSIS — B07.0 PLANTAR WART: ICD-10-CM

## 2023-09-07 DIAGNOSIS — M79.672 LEFT FOOT PAIN: ICD-10-CM

## 2023-09-07 DIAGNOSIS — L97.521 ULCER OF LEFT FOOT, LIMITED TO BREAKDOWN OF SKIN: Primary | ICD-10-CM

## 2023-09-07 PROCEDURE — 99499 NO LOS: ICD-10-PCS | Mod: S$GLB,,, | Performed by: PODIATRIST

## 2023-09-07 PROCEDURE — 97597 DBRDMT OPN WND 1ST 20 CM/<: CPT | Mod: S$GLB,,, | Performed by: PODIATRIST

## 2023-09-07 PROCEDURE — 99499 UNLISTED E&M SERVICE: CPT | Mod: S$GLB,,, | Performed by: PODIATRIST

## 2023-09-07 PROCEDURE — 97597 WOUND DEBRIDEMENT: ICD-10-PCS | Mod: S$GLB,,, | Performed by: PODIATRIST

## 2023-09-08 NOTE — PROGRESS NOTES
"Subjective:     Patient ID: Julian Enciso is a 68 y.o. male.    Chief Complaint: Foot Ulcer    Julian is a 68 y.o. male who presents to the clinic for evaluation and treatment of high risk feet. Julian has a past medical history of Hypertension, Hypothyroidism, and Thyroid disease. The patient's chief complaint is foot ulcer/ verrucae, left 1st MPJ.         Current shoe gear:  Affected Foot: Tennis shoes     Unaffected Foot: Tennis shoes    History of Trauma: negative  Sign of Infection: none    No results found for: "HGBA1C"    Review of Systems   Constitutional: Negative for chills and fever.   Cardiovascular:  Negative for chest pain and leg swelling.   Respiratory:  Negative for cough and shortness of breath.    Gastrointestinal:  Negative for diarrhea, nausea and vomiting.        Objective:     Physical Exam  Vitals reviewed.   Constitutional:       General: He is not in acute distress.     Appearance: Normal appearance. He is not ill-appearing.   HENT:      Head: Normocephalic.      Nose: Nose normal.   Cardiovascular:      Rate and Rhythm: Normal rate.   Pulmonary:      Effort: Pulmonary effort is normal. No respiratory distress.   Skin:     Capillary Refill: Capillary refill takes 2 to 3 seconds.   Neurological:      Mental Status: He is alert and oriented to person, place, and time.   Psychiatric:         Mood and Affect: Mood normal.         Behavior: Behavior normal.         Thought Content: Thought content normal.     Dermatologic:  soft tissue mass/ Ulceration noted to left 1st digit/ MPJ measuring 2.3 x 2.0 x 0.1 cm with hyperkeratotic perimeter, dorsal medial skin margins seem to exhibit wart like chareacteristics with noted pinpoint bleeding but no signs of infection,  no rashes noted bilateral foot, no interdigital maceration noted bilateral foot   Vascular: DP and PT pulses palpable 1/4 bilateral foot, capillary fill time less than 3 seconds to distal digits bilateral foot   Musculoskeletal: "  5/5 muscle strength noted bilateral foot, ankle joint range of motion is reduced without pain, pain and tenderness with palpation of the left 1st MPJ ulceration  Neurologic:  Protective and  light touch sensation intact bilateral lower extremity    Assessment:      Encounter Diagnoses   Name Primary?    Ulcer of left foot, limited to breakdown of skin Yes    Plantar wart      Plan:     Julian was seen today for foot ulcer.    Diagnoses and all orders for this visit:    Ulcer of left foot, limited to breakdown of skin  -     Wound Debridement    Plantar wart      I counseled the patient on his conditions, their implications and medical management.        1. Patient was examined and evaluated.    2. Discussed with patient presence of verruca changes as well as ulceration to soft tissue as left 1st MPJ.  Following debridement of the lesion, topical salicylic acid was applied.  Patient will keep this dressing clean, dry, and intact for 1 to 3 days.  Wound Debridement    Date/Time: 8/24/2023 1:45 PM    Performed by: Viry Anthony DPM  Authorized by: Viry Anthony DPM    Consent Done?:  Yes (Verbal)    Wound Details:    Location:  Left foot    Location:  Left 1st Metatarsal Head       Length (cm):  2.3       Area (sq cm):  4.6       Width (cm):  2       Percent Debrided (%):  100       Depth (cm):  0.1       Total Area Debrided (sq cm):  4.6    Depth of debridement:  Epidermis/Dermis    Devitalized tissue debrided:  Callus    Instruments:  Blade  Bleeding:  Moderate  Hemostasis Achieved: Yes  Method Used:  Pressure  Patient tolerance:  Patient tolerated the procedure well with no immediate complications  1st Wound Pain Assessment: 5      3. Patient was dispensed offloading pads for prevention of direct pressure to lesion.  4. Discussed with patient potential for recurrence of verruca over time.  Patient was made aware that pain seems to be related to direct contact and the location of the lesion.  Patient  was advised adjunct with OTC NSAIDs for pain relief  5. Patient will decrease the amount of barefoot walking continue with comfortable shoe gear both inside and outside the home.    6. Patient will follow-up in 1 week or p.r.n. for complaints

## 2023-09-12 NOTE — PROGRESS NOTES
"Subjective:     Patient ID: Julian Enciso is a 68 y.o. male.    Chief Complaint: Follow-up and Foot Ulcer    Julian is a 68 y.o. male who presents to the clinic for evaluation and treatment of high risk feet. Julian has a past medical history of Hypertension, Hypothyroidism, and Thyroid disease. The patient's chief complaint is foot ulcer/ soft tissue lesion/ verrucae, left 1st MPJ.  Patient reports continued application of topical salicylic acid at home every 2-3 days as directed.  Patient states the area is mildly tender when ambulating.  Patient reports pain 5/10.    PCP: No, Primary Doctor        Current shoe gear:  Affected Foot: Tennis shoes     Unaffected Foot: Tennis shoes    History of Trauma: negative  Sign of Infection: none    No results found for: "HGBA1C"    Review of Systems   Constitutional: Negative for chills and fever.   Cardiovascular:  Negative for chest pain and leg swelling.   Respiratory:  Negative for cough and shortness of breath.    Gastrointestinal:  Negative for diarrhea, nausea and vomiting.        Objective:     Physical Exam  Vitals reviewed.   Constitutional:       General: He is not in acute distress.     Appearance: Normal appearance. He is not ill-appearing.   HENT:      Head: Normocephalic.      Nose: Nose normal.   Cardiovascular:      Rate and Rhythm: Normal rate.   Pulmonary:      Effort: Pulmonary effort is normal. No respiratory distress.   Skin:     Capillary Refill: Capillary refill takes 2 to 3 seconds.   Neurological:      Mental Status: He is alert and oriented to person, place, and time.   Psychiatric:         Mood and Affect: Mood normal.         Behavior: Behavior normal.         Thought Content: Thought content normal.     Dermatologic:  soft tissue mass/ Ulceration noted to left 1st digit/ MPJ measuring 2.5 x 2.1 x 0.1 cm with hyperkeratotic perimeter, dorsal medial skin margins seem to exhibit wart like chareacteristics with noted pinpoint bleeding but no " signs of infection,  no rashes noted bilateral foot, no interdigital maceration noted bilateral foot   Vascular: DP and PT pulses palpable 1/4 bilateral foot, capillary fill time less than 3 seconds to distal digits bilateral foot   Musculoskeletal:  5/5 muscle strength noted bilateral foot, ankle joint range of motion is reduced without pain, pain and tenderness with palpation of the left 1st MPJ ulceration  Neurologic:  Protective and  light touch sensation intact bilateral lower extremity      Assessment:      Encounter Diagnoses   Name Primary?    Plantar wart     Ulcer of left foot, limited to breakdown of skin Yes    Left foot pain      Plan:     Julian was seen today for follow-up and foot ulcer.    Diagnoses and all orders for this visit:    Ulcer of left foot, limited to breakdown of skin  -     Wound Debridement    Plantar wart  -     Wound Debridement    Left foot pain  -     Wound Debridement      I counseled the patient on his conditions, their implications and medical management.        1. Patient was examined and evaluated.    2. Again Discussed with patient presence of verruca changes as well as ulceration to soft tissue as left 1st MPJ.  Following debridement of the lesion, topical salicylic acid was applied.  Patient will keep this dressing clean, dry, and intact for 1 to 3 days.  Wound Debridement    Date/Time: 8/29/2023 2:15 PM    Performed by: Viry Anthony DPM  Authorized by: Viry Anthony DPM    Consent Done?:  Yes (Verbal)    Preparation: Patient was prepped and draped in usual sterile fashion      Wound Details:    Location:  Left foot    Location:  Left 1st Metatarsal Head       Length (cm):  2.5       Area (sq cm):  5.25       Width (cm):  2.1       Percent Debrided (%):  100       Depth (cm):  0.1       Total Area Debrided (sq cm):  5.25    Depth of debridement:  Epidermis/Dermis    Devitalized tissue debrided:  Biofilm, Callus and Slough    Instruments:  Blade  Bleeding:   Moderate  Hemostasis Achieved: Yes  Method Used:  Pressure  Patient tolerance:  Patient tolerated the procedure well with no immediate complications  1st Wound Pain Assessment: 5      3. Patient was dispensed offloading pads for prevention of direct pressure to lesion.  4. Discussed with patient potential for recurrence of verruca over time.  Patient was made aware that pain seems to be related to direct contact and the location of the lesion.  Patient was advised adjunct with OTC NSAIDs for pain relief  5. Patient will decrease the amount of barefoot walking continue with comfortable shoe gear both inside and outside the home.    6. Patient will follow-up in 1 week or p.r.n. for complaints

## 2023-09-14 ENCOUNTER — OFFICE VISIT (OUTPATIENT)
Dept: PODIATRY | Facility: CLINIC | Age: 68
End: 2023-09-14
Payer: MEDICARE

## 2023-09-14 VITALS — WEIGHT: 300 LBS | BODY MASS INDEX: 42.95 KG/M2 | HEIGHT: 70 IN

## 2023-09-14 DIAGNOSIS — L97.521 ULCER OF LEFT FOOT, LIMITED TO BREAKDOWN OF SKIN: Primary | ICD-10-CM

## 2023-09-14 DIAGNOSIS — B07.0 PLANTAR WART: ICD-10-CM

## 2023-09-14 DIAGNOSIS — M79.672 LEFT FOOT PAIN: ICD-10-CM

## 2023-09-14 PROCEDURE — 97597 WOUND DEBRIDEMENT: ICD-10-PCS | Mod: S$GLB,,, | Performed by: PODIATRIST

## 2023-09-14 PROCEDURE — 99499 UNLISTED E&M SERVICE: CPT | Mod: S$GLB,,, | Performed by: PODIATRIST

## 2023-09-14 PROCEDURE — 99499 NO LOS: ICD-10-PCS | Mod: S$GLB,,, | Performed by: PODIATRIST

## 2023-09-14 PROCEDURE — 97597 DBRDMT OPN WND 1ST 20 CM/<: CPT | Mod: S$GLB,,, | Performed by: PODIATRIST

## 2023-09-21 ENCOUNTER — OFFICE VISIT (OUTPATIENT)
Dept: PODIATRY | Facility: CLINIC | Age: 68
End: 2023-09-21
Payer: MEDICARE

## 2023-09-21 VITALS — HEIGHT: 70 IN | BODY MASS INDEX: 42.95 KG/M2 | WEIGHT: 300 LBS

## 2023-09-21 DIAGNOSIS — L97.521 ULCER OF LEFT FOOT, LIMITED TO BREAKDOWN OF SKIN: Primary | ICD-10-CM

## 2023-09-21 DIAGNOSIS — B07.0 PLANTAR WART: ICD-10-CM

## 2023-09-21 PROCEDURE — 97597 DBRDMT OPN WND 1ST 20 CM/<: CPT | Mod: S$GLB,,, | Performed by: PODIATRIST

## 2023-09-21 PROCEDURE — 97597 WOUND DEBRIDEMENT: ICD-10-PCS | Mod: S$GLB,,, | Performed by: PODIATRIST

## 2023-09-21 PROCEDURE — 99499 NO LOS: ICD-10-PCS | Mod: S$GLB,,, | Performed by: PODIATRIST

## 2023-09-21 PROCEDURE — 99499 UNLISTED E&M SERVICE: CPT | Mod: S$GLB,,, | Performed by: PODIATRIST

## 2023-09-21 RX ORDER — CEFUROXIME AXETIL 500 MG/1
500 TABLET ORAL 2 TIMES DAILY
COMMUNITY
Start: 2023-09-18

## 2023-09-21 NOTE — PROGRESS NOTES
"Subjective:     Patient ID: Julian Enciso is a 68 y.o. male.    Chief Complaint: Foot Ulcer    Julian is a 68 y.o. male who presents to the clinic for evaluation and treatment of high risk feet. Julian has a past medical history of Hypertension, Hypothyroidism, and Thyroid disease. The patient's chief complaint is foot ulcer, medial plantar left 1st MPJ.     PCP: No, Primary Doctor        Current shoe gear:  Affected Foot: Tennis shoes     Unaffected Foot: Tennis shoes    History of Trauma: negative  Sign of Infection: none    No results found for: "HGBA1C"    Review of Systems   Constitutional: Negative for chills and fever.   Cardiovascular:  Negative for chest pain and leg swelling.   Respiratory:  Negative for cough and shortness of breath.    Gastrointestinal:  Negative for diarrhea, nausea and vomiting.        Objective:     Physical Exam  Vitals reviewed.   Constitutional:       General: He is not in acute distress.     Appearance: Normal appearance. He is not ill-appearing.   HENT:      Head: Normocephalic.      Nose: Nose normal.   Cardiovascular:      Rate and Rhythm: Normal rate.   Pulmonary:      Effort: Pulmonary effort is normal. No respiratory distress.   Skin:     Capillary Refill: Capillary refill takes 2 to 3 seconds.   Neurological:      Mental Status: He is alert and oriented to person, place, and time.   Psychiatric:         Mood and Affect: Mood normal.         Behavior: Behavior normal.         Thought Content: Thought content normal.     Dermatologic:  soft tissue mass/ Ulceration noted to left 1st digit/ MPJ measuring 2.3 x 1.9 x 0.1 cm with hyperkeratotic perimeter, dorsal medial skin margins seem to exhibit wart like characteristics with noted pinpoint bleeding but no signs of infection,  no rashes noted bilateral foot, no interdigital maceration noted bilateral foot   Vascular: DP and PT pulses palpable 1/4 bilateral foot, capillary fill time less than 3 seconds to distal digits " bilateral foot   Musculoskeletal:  5/5 muscle strength noted bilateral foot, ankle joint range of motion is reduced without pain, pain and tenderness with palpation of the left 1st MPJ ulceration  Neurologic:  Protective and  light touch sensation intact bilateral lower extremity      Assessment:      Encounter Diagnoses   Name Primary?    Ulcer of left foot, limited to breakdown of skin Yes    Plantar wart     Left foot pain      Plan:     Julian was seen today for foot ulcer.    Diagnoses and all orders for this visit:    Ulcer of left foot, limited to breakdown of skin  -     Wound Debridement    Plantar wart  -     Wound Debridement    Left foot pain  -     Wound Debridement      I counseled the patient on his conditions, their implications and medical management.        1. Patient was examined and evaluated.    2. Again Discussed with patient presence of verruca changes as well as ulceration to soft tissue as left 1st MPJ.  Following debridement of the lesion, topical salicylic acid was applied.  Patient will keep this dressing clean, dry, and intact for 1 to 3 days.  Wound Debridement    Date/Time: 9/7/2023 1:00 PM    Performed by: Viry Anthony DPM  Authorized by: Viry Anthony DPM    Consent Done?:  Yes (Verbal)    Wound Details:    Location:  Left foot    Location:  Left 1st Metatarsal Head       Length (cm):  2.3       Area (sq cm):  4.37       Width (cm):  1.9       Percent Debrided (%):  100       Depth (cm):  0.1       Total Area Debrided (sq cm):  4.37    Depth of debridement:  Epidermis/Dermis    Devitalized tissue debrided:  Biofilm and Callus    Instruments:  Blade  Bleeding:  Minimal  Hemostasis Achieved: Yes  Method Used:  Pressure  Patient tolerance:  Patient tolerated the procedure well with no immediate complications  1st Wound Pain Assessment: 3      3. Patient was dispensed offloading pads for prevention of direct pressure to lesion.  4. Discussed with patient potential for  recurrence of verruca over time.  Patient was made aware that pain seems to be related to direct contact and the location of the lesion.  Patient was advised adjunct with OTC NSAIDs for pain relief  5. Patient will decrease the amount of barefoot walking continue with comfortable shoe gear both inside and outside the home.    6. Patient will follow-up in 1 week or p.r.n. for complaints

## 2023-09-28 ENCOUNTER — OFFICE VISIT (OUTPATIENT)
Dept: PODIATRY | Facility: CLINIC | Age: 68
End: 2023-09-28
Payer: MEDICARE

## 2023-09-28 VITALS
BODY MASS INDEX: 42.95 KG/M2 | HEIGHT: 70 IN | WEIGHT: 300 LBS | DIASTOLIC BLOOD PRESSURE: 83 MMHG | HEART RATE: 61 BPM | SYSTOLIC BLOOD PRESSURE: 151 MMHG

## 2023-09-28 DIAGNOSIS — B07.0 PLANTAR WART: ICD-10-CM

## 2023-09-28 DIAGNOSIS — L97.521 ULCER OF LEFT FOOT, LIMITED TO BREAKDOWN OF SKIN: Primary | ICD-10-CM

## 2023-09-28 PROCEDURE — 99499 NO LOS: ICD-10-PCS | Mod: S$GLB,,, | Performed by: PODIATRIST

## 2023-09-28 PROCEDURE — 99499 UNLISTED E&M SERVICE: CPT | Mod: S$GLB,,, | Performed by: PODIATRIST

## 2023-09-28 PROCEDURE — 97597 WOUND DEBRIDEMENT: ICD-10-PCS | Mod: S$GLB,,, | Performed by: PODIATRIST

## 2023-09-28 PROCEDURE — 97597 DBRDMT OPN WND 1ST 20 CM/<: CPT | Mod: S$GLB,,, | Performed by: PODIATRIST

## 2023-10-05 NOTE — PROGRESS NOTES
"Subjective:     Patient ID: Julian Enciso is a 68 y.o. male.    Chief Complaint: Warts    Julian is a 68 y.o. male who presents to the clinic for evaluation and treatment of high risk feet. Julian has a past medical history of Hypertension, Hypothyroidism, and Thyroid disease. The patient's chief complaint is foot ulcer/ wart, left 1st MPJ.         Current shoe gear:  Affected Foot: Tennis shoes     Unaffected Foot: Tennis shoes    History of Trauma: negative  Sign of Infection: none    No results found for: "HGBA1C"    Review of Systems   Constitutional: Negative for chills and fever.   Cardiovascular:  Negative for chest pain and leg swelling.   Respiratory:  Negative for cough and shortness of breath.    Gastrointestinal:  Negative for diarrhea, nausea and vomiting.        Objective:     Physical Exam  Vitals reviewed.   Constitutional:       General: He is not in acute distress.     Appearance: Normal appearance. He is not ill-appearing.   HENT:      Head: Normocephalic.      Nose: Nose normal.   Cardiovascular:      Rate and Rhythm: Normal rate.   Pulmonary:      Effort: Pulmonary effort is normal. No respiratory distress.   Skin:     Capillary Refill: Capillary refill takes 2 to 3 seconds.   Neurological:      Mental Status: He is alert and oriented to person, place, and time.   Psychiatric:         Mood and Affect: Mood normal.         Behavior: Behavior normal.         Thought Content: Thought content normal.       Dermatologic:  soft tissue mass/ Ulceration noted to left 1st digit/ MPJ measuring 1.0 x 1.0 x 0.1 cm with hyperkeratotic perimeter, dorsal medial skin margins seem to exhibit wart like characteristics with noted pinpoint bleeding but no signs of infection,  no rashes noted bilateral foot, no interdigital maceration noted bilateral foot   Vascular: DP and PT pulses palpable 1/4 bilateral foot, capillary fill time less than 3 seconds to distal digits bilateral foot   Musculoskeletal:  5/5 " muscle strength noted bilateral foot, ankle joint range of motion is reduced without pain, pain and tenderness with palpation of the left 1st MPJ ulceration  Neurologic:  Protective and  light touch sensation intact bilateral lower extremity       Assessment:      Encounter Diagnoses   Name Primary?    Ulcer of left foot, limited to breakdown of skin Yes    Plantar wart      Plan:     Julian was seen today for warts.    Diagnoses and all orders for this visit:    Ulcer of left foot, limited to breakdown of skin  -     Wound Debridement    Plantar wart  -     Wound Debridement      I counseled the patient on his conditions, their implications and medical management.        1. Patient was examined and evaluated.    2. Again Discussed with patient presence of verruca changes as well as ulceration to soft tissue as left 1st MPJ.  Following debridement of the lesion, topical salicylic acid was applied.  Patient will keep this dressing clean, dry, and intact for 1 to 3 days.  Wound Debridement    Date/Time: 9/21/2023 11:15 AM    Performed by: Viry Anthony DPM  Authorized by: Viry Anthony DPM    Consent Done?:  Yes (Verbal)    Wound Details:    Location:  Left foot    Location:  Left 1st Metatarsal Head       Length (cm):  1       Area (sq cm):  1       Width (cm):  1       Percent Debrided (%):  100       Depth (cm):  0.1       Total Area Debrided (sq cm):  1    Depth of debridement:  Epidermis/Dermis    Devitalized tissue debrided:  Biofilm    Instruments:  Blade  Bleeding:  Minimal  Hemostasis Achieved: Yes  Method Used:  Pressure  Patient tolerance:  Patient tolerated the procedure well with no immediate complications  1st Wound Pain Assessment: 0       3. Patient was dispensed offloading pads for prevention of direct pressure to lesion.  4. Discussed with patient potential for recurrence of verruca over time.  Patient was made aware that pain seems to be related to direct contact and the location of the  lesion.  Patient was advised adjunct with OTC NSAIDs for pain relief  5. Patient will decrease the amount of barefoot walking continue with comfortable shoe gear both inside and outside the home.    6. Patient will follow-up in 1 week or p.r.n. for complaints

## 2023-10-06 ENCOUNTER — OFFICE VISIT (OUTPATIENT)
Dept: PODIATRY | Facility: CLINIC | Age: 68
End: 2023-10-06
Payer: MEDICARE

## 2023-10-06 VITALS
BODY MASS INDEX: 42.95 KG/M2 | SYSTOLIC BLOOD PRESSURE: 144 MMHG | WEIGHT: 300 LBS | DIASTOLIC BLOOD PRESSURE: 83 MMHG | HEIGHT: 70 IN | HEART RATE: 67 BPM

## 2023-10-06 DIAGNOSIS — L97.521 ULCER OF LEFT FOOT, LIMITED TO BREAKDOWN OF SKIN: Primary | ICD-10-CM

## 2023-10-06 DIAGNOSIS — B07.0 PLANTAR WART: ICD-10-CM

## 2023-10-06 PROCEDURE — 99499 UNLISTED E&M SERVICE: CPT | Mod: S$GLB,,, | Performed by: PODIATRIST

## 2023-10-06 PROCEDURE — 97597 DBRDMT OPN WND 1ST 20 CM/<: CPT | Mod: S$GLB,,, | Performed by: PODIATRIST

## 2023-10-06 PROCEDURE — 97597 WOUND DEBRIDEMENT: ICD-10-PCS | Mod: S$GLB,,, | Performed by: PODIATRIST

## 2023-10-06 PROCEDURE — 99499 NO LOS: ICD-10-PCS | Mod: S$GLB,,, | Performed by: PODIATRIST

## 2023-10-06 RX ORDER — KETOROLAC TROMETHAMINE 10 MG/1
10 TABLET, FILM COATED ORAL EVERY 6 HOURS PRN
COMMUNITY
Start: 2023-10-04

## 2023-10-06 NOTE — PROGRESS NOTES
"Subjective:     Patient ID: Julian Enciso is a 68 y.o. male.    Chief Complaint: Foot Ulcer    Julian is a 68 y.o. male who presents to the clinic for evaluation and treatment of high risk feet. Julian has a past medical history of Hypertension, Hypothyroidism, and Thyroid disease. The patient's chief complaint is foot ulcer and wart, left 1st MPJ.           Current shoe gear:  Affected Foot: Tennis shoes     Unaffected Foot: Tennis shoes    History of Trauma: negative  Sign of Infection: none    No results found for: "HGBA1C"    Review of Systems   Constitutional: Negative for chills and fever.   Cardiovascular:  Negative for chest pain and leg swelling.   Respiratory:  Negative for cough and shortness of breath.    Gastrointestinal:  Negative for diarrhea, nausea and vomiting.        Objective:     Physical Exam  Vitals reviewed.   Constitutional:       General: He is not in acute distress.     Appearance: Normal appearance. He is not ill-appearing.   HENT:      Head: Normocephalic.      Nose: Nose normal.   Cardiovascular:      Rate and Rhythm: Normal rate.   Pulmonary:      Effort: Pulmonary effort is normal. No respiratory distress.   Skin:     Capillary Refill: Capillary refill takes 2 to 3 seconds.   Neurological:      Mental Status: He is alert and oriented to person, place, and time.   Psychiatric:         Mood and Affect: Mood normal.         Behavior: Behavior normal.         Thought Content: Thought content normal.     Dermatologic:  soft tissue mass/ Ulceration noted to left 1st digit/ MPJ measuring 3.0 x 2.5 x 0.1 cm with hyperkeratotic perimeter, dorsal medial skin margins seem to exhibit wart like characteristics with noted pinpoint bleeding but no signs of infection,  no rashes noted bilateral foot, no interdigital maceration noted bilateral foot   Vascular: DP and PT pulses palpable 1/4 bilateral foot, capillary fill time less than 3 seconds to distal digits bilateral foot   Musculoskeletal: "  5/5 muscle strength noted bilateral foot, ankle joint range of motion is reduced without pain, pain and tenderness with palpation of the left 1st MPJ ulceration  Neurologic:  Protective and  light touch sensation intact bilateral lower extremity      Assessment:      Encounter Diagnoses   Name Primary?    Ulcer of left foot, limited to breakdown of skin Yes    Plantar wart      Plan:     Julian was seen today for foot ulcer.    Diagnoses and all orders for this visit:    Ulcer of left foot, limited to breakdown of skin  -     Wound Debridement    Plantar wart  -     Wound Debridement      I counseled the patient on his conditions, their implications and medical management.        1. Patient was examined and evaluated.    2. Again Discussed with patient presence of verruca changes as well as ulceration to soft tissue as left 1st MPJ.  Following debridement of the lesion, topical salicylic acid was applied.  Patient will keep this dressing clean, dry, and intact for 1 to 3 days.  Wound Debridement    Date/Time: 10/6/2023 8:30 AM    Performed by: Viry Anthony DPM  Authorized by: Viry Anthony DPM    Consent Done?:  Yes (Verbal)    Wound Details:    Location:  Left foot    Location:  Left 1st Metatarsal Head       Length (cm):  3       Area (sq cm):  7.5       Width (cm):  2.5       Percent Debrided (%):  100       Depth (cm):  0.1       Total Area Debrided (sq cm):  7.5    Depth of debridement:  Epidermis/Dermis    Devitalized tissue debrided:  Biofilm and Callus    Instruments:  Blade  Bleeding:  Minimal  Hemostasis Achieved: Yes  Method Used:  Pressure  Patient tolerance:  Patient tolerated the procedure well with no immediate complications  1st Wound Pain Assessment: 2      3. Patient was dispensed offloading pads for prevention of direct pressure to lesion.  4. Discussed with patient potential for recurrence of verruca over time.  Patient was made aware that pain seems to be related to direct contact  and the location of the lesion.  Patient was advised adjunct with OTC NSAIDs for pain relief  5. Patient will decrease the amount of barefoot walking continue with comfortable shoe gear both inside and outside the home.    6. Patient will follow-up in 1 week or p.r.n. for complaints

## 2023-10-10 NOTE — PROGRESS NOTES
"Subjective:     Patient ID: Julian Enciso is a 68 y.o. male.    Chief Complaint: Follow-up    Julian is a 68 y.o. male who presents to the clinic for evaluation and treatment of high risk feet. Julian has a past medical history of Hypertension, Hypothyroidism, and Thyroid disease. The patient's chief complaint is foot ulcer, left 1st MPJ.       PCP: No, Primary Doctor        Current shoe gear:  Affected Foot: Tennis shoes     Unaffected Foot: Tennis shoes    History of Trauma: negative  Sign of Infection: none    No results found for: "HGBA1C"    Review of Systems   Constitutional: Negative for chills and fever.   Cardiovascular:  Negative for chest pain and leg swelling.   Respiratory:  Negative for cough and shortness of breath.    Gastrointestinal:  Negative for diarrhea, nausea and vomiting.        Objective:     Physical Exam  Vitals reviewed.   Constitutional:       General: He is not in acute distress.     Appearance: Normal appearance. He is not ill-appearing.   HENT:      Head: Normocephalic.      Nose: Nose normal.   Cardiovascular:      Rate and Rhythm: Normal rate.   Pulmonary:      Effort: Pulmonary effort is normal. No respiratory distress.   Skin:     Capillary Refill: Capillary refill takes 2 to 3 seconds.   Neurological:      Mental Status: He is alert and oriented to person, place, and time.   Psychiatric:         Mood and Affect: Mood normal.         Behavior: Behavior normal.         Thought Content: Thought content normal.       Dermatologic:  soft tissue mass/ Ulceration noted to left 1st digit/ MPJ measuring 3.5 x 3.0 x 0.1 cm with hyperkeratotic perimeter, dorsal medial skin margins seem to exhibit wart like characteristics with noted pinpoint bleeding but no signs of infection,  no rashes noted bilateral foot, no interdigital maceration noted bilateral foot   Vascular: DP and PT pulses palpable 1/4 bilateral foot, capillary fill time less than 3 seconds to distal digits bilateral foot "   Musculoskeletal:  5/5 muscle strength noted bilateral foot, ankle joint range of motion is reduced without pain, pain and tenderness with palpation of the left 1st MPJ ulceration  Neurologic:  Protective and  light touch sensation intact bilateral lower extremity    Assessment:      Encounter Diagnoses   Name Primary?    Ulcer of left foot, limited to breakdown of skin Yes    Plantar wart      Plan:     Julian was seen today for follow-up.    Diagnoses and all orders for this visit:    Ulcer of left foot, limited to breakdown of skin  -     Wound Debridement    Plantar wart  -     Wound Debridement      I counseled the patient on his conditions, their implications and medical management.           1. Patient was examined and evaluated.    2. Again Discussed with patient presence of verruca changes as well as ulceration to soft tissue as left 1st MPJ.  Following debridement of the lesion, topical salicylic acid was applied.  Patient will keep this dressing clean, dry, and intact for 1 to 3 days.  Wound Debridement    Date/Time: 9/28/2023 11:00 AM    Performed by: Viry Anthony DPM  Authorized by: Viry Anthony DPM    Consent Done?:  Yes (Verbal)    Wound Details:    Location:  Left foot    Location:  Left 1st Metatarsal Head       Length (cm):  3.5       Area (sq cm):  10.5       Width (cm):  3       Percent Debrided (%):  100       Depth (cm):  0.1       Total Area Debrided (sq cm):  10.5    Depth of debridement:  Epidermis/Dermis    Instruments:  Curette and Blade  Bleeding:  Minimal  Hemostasis Achieved: Yes  Method Used:  Pressure  Patient tolerance:  Patient tolerated the procedure well with no immediate complications  1st Wound Pain Assessment: 2      3. Patient was dispensed offloading pads for prevention of direct pressure to lesion.  4. Discussed with patient potential for recurrence of verruca over time.  Patient was made aware that pain seems to be related to direct contact and the location  of the lesion.  Patient was advised adjunct with OTC NSAIDs for pain relief  5. Patient will decrease the amount of barefoot walking continue with comfortable shoe gear both inside and outside the home.    6. Patient will follow-up in 1 week or p.r.n. for complaints

## 2023-10-13 ENCOUNTER — OFFICE VISIT (OUTPATIENT)
Dept: PODIATRY | Facility: CLINIC | Age: 68
End: 2023-10-13
Payer: MEDICARE

## 2023-10-13 VITALS
HEART RATE: 57 BPM | HEIGHT: 70 IN | DIASTOLIC BLOOD PRESSURE: 85 MMHG | SYSTOLIC BLOOD PRESSURE: 159 MMHG | BODY MASS INDEX: 42.95 KG/M2 | WEIGHT: 300 LBS

## 2023-10-13 DIAGNOSIS — B07.0 PLANTAR WART: ICD-10-CM

## 2023-10-13 DIAGNOSIS — L97.521 ULCER OF LEFT FOOT, LIMITED TO BREAKDOWN OF SKIN: Primary | ICD-10-CM

## 2023-10-13 DIAGNOSIS — M79.672 LEFT FOOT PAIN: ICD-10-CM

## 2023-10-13 PROCEDURE — 99499 NO LOS: ICD-10-PCS | Mod: S$GLB,,, | Performed by: PODIATRIST

## 2023-10-13 PROCEDURE — 97597 WOUND DEBRIDEMENT: ICD-10-PCS | Mod: S$GLB,,, | Performed by: PODIATRIST

## 2023-10-13 PROCEDURE — 97597 DBRDMT OPN WND 1ST 20 CM/<: CPT | Mod: S$GLB,,, | Performed by: PODIATRIST

## 2023-10-13 PROCEDURE — 99499 UNLISTED E&M SERVICE: CPT | Mod: S$GLB,,, | Performed by: PODIATRIST

## 2023-10-20 ENCOUNTER — OFFICE VISIT (OUTPATIENT)
Dept: PODIATRY | Facility: CLINIC | Age: 68
End: 2023-10-20
Payer: MEDICARE

## 2023-10-20 VITALS
SYSTOLIC BLOOD PRESSURE: 167 MMHG | WEIGHT: 300 LBS | HEART RATE: 60 BPM | BODY MASS INDEX: 42.95 KG/M2 | HEIGHT: 70 IN | DIASTOLIC BLOOD PRESSURE: 84 MMHG

## 2023-10-20 DIAGNOSIS — L97.521 ULCER OF LEFT FOOT, LIMITED TO BREAKDOWN OF SKIN: Primary | ICD-10-CM

## 2023-10-20 DIAGNOSIS — B07.0 PLANTAR WART: ICD-10-CM

## 2023-10-20 DIAGNOSIS — M79.672 LEFT FOOT PAIN: ICD-10-CM

## 2023-10-20 PROCEDURE — 97597 DBRDMT OPN WND 1ST 20 CM/<: CPT | Mod: S$GLB,,, | Performed by: PODIATRIST

## 2023-10-20 PROCEDURE — 99499 UNLISTED E&M SERVICE: CPT | Mod: S$GLB,,, | Performed by: PODIATRIST

## 2023-10-20 PROCEDURE — 99499 NO LOS: ICD-10-PCS | Mod: S$GLB,,, | Performed by: PODIATRIST

## 2023-10-20 PROCEDURE — 97597 WOUND DEBRIDEMENT: ICD-10-PCS | Mod: S$GLB,,, | Performed by: PODIATRIST

## 2023-10-20 RX ORDER — BICALUTAMIDE 50 MG/1
50 TABLET, FILM COATED ORAL
COMMUNITY
Start: 2023-10-13

## 2023-10-20 NOTE — PROGRESS NOTES
"Subjective:     Patient ID: Julian Enciso is a 68 y.o. male.    Chief Complaint: Foot Ulcer    Julian is a 68 y.o. male who presents to the clinic for evaluation and treatment of high risk feet. Julian has a past medical history of Hypertension, Hypothyroidism, and Thyroid disease. The patient's chief complaint is foot ulcer and wart, left 1st mpj plantarly.  Reports continued home application of slamming cane after removal of bandages placed on in the clinic without incident.  Patient does report minor numbness tingling and pain from the left 1st MPJ.  Patient currently rates pain 2/10.        Current shoe gear:  Affected Foot: Tennis shoes     Unaffected Foot: Tennis shoes    History of Trauma: negative  Sign of Infection: none    No results found for: "HGBA1C"    Review of Systems   Constitutional: Negative for chills and fever.   Cardiovascular:  Negative for chest pain and leg swelling.   Respiratory:  Negative for cough and shortness of breath.    Gastrointestinal:  Negative for diarrhea, nausea and vomiting.   Neurological:  Positive for paresthesias.        Objective:     Physical Exam  Vitals reviewed.   Constitutional:       General: He is not in acute distress.     Appearance: Normal appearance. He is not ill-appearing.   HENT:      Head: Normocephalic.      Nose: Nose normal.   Cardiovascular:      Rate and Rhythm: Normal rate.   Pulmonary:      Effort: Pulmonary effort is normal. No respiratory distress.   Skin:     Capillary Refill: Capillary refill takes 2 to 3 seconds.   Neurological:      Mental Status: He is alert and oriented to person, place, and time.   Psychiatric:         Mood and Affect: Mood normal.         Behavior: Behavior normal.         Thought Content: Thought content normal.         Dermatologic:  soft tissue mass/ Ulceration noted to left 1st digit/ MPJ measuring 2.8 x 2.2 x 0.1 cm with hyperkeratotic perimeter, dorsal medial skin margins seem to exhibit wart like " characteristics with noted pinpoint bleeding but no signs of infection,  no rashes noted bilateral foot, no interdigital maceration noted bilateral foot   Vascular: DP and PT pulses palpable 1/4 bilateral foot, capillary fill time less than 3 seconds to distal digits bilateral foot   Musculoskeletal:  5/5 muscle strength noted bilateral foot, ankle joint range of motion is reduced without pain, pain and tenderness with palpation of the left 1st MPJ ulceration  Neurologic:  Protective and  light touch sensation intact bilateral lower extremity    Assessment:      Encounter Diagnoses   Name Primary?    Ulcer of left foot, limited to breakdown of skin Yes    Plantar wart     Left foot pain      Plan:     Julian was seen today for foot ulcer.    Diagnoses and all orders for this visit:    Ulcer of left foot, limited to breakdown of skin  -     Wound Debridement    Plantar wart  -     Wound Debridement    Left foot pain  -     Wound Debridement      I counseled the patient on his conditions, their implications and medical management.        1. Patient was examined and evaluated.    2. Again Discussed with patient presence of verruca changes as well as ulceration to soft tissue as left 1st MPJ.  Following debridement of the lesion, topical salicylic acid was applied.  Patient will keep this dressing clean, dry, and intact for 1 to 3 days.  Wound Debridement    Date/Time: 10/20/2023 8:30 AM    Performed by: Viry Anthony DPM  Authorized by: Viry Anthony DPM    Consent Done?:  Yes (Verbal)    Wound Details:    Location:  Left foot    Location:  Left 1st Metatarsal Head       Length (cm):  2.8       Area (sq cm):  6.16       Width (cm):  2.2       Percent Debrided (%):  100       Depth (cm):  0.1       Total Area Debrided (sq cm):  6.16    Depth of debridement:  Epidermis/Dermis    Devitalized tissue debrided:  Biofilm, Callus and Slough    Instruments:  Blade  Bleeding:  Minimal  Hemostasis Achieved:  Yes  Method Used:  Pressure  Patient tolerance:  Patient tolerated the procedure well with no immediate complications  1st Wound Pain Assessment: 2      3. Patient was dispensed offloading pads for prevention of direct pressure to lesion.  4. Discussed with patient potential for recurrence of verruca over time.  Patient was made aware that pain seems to be related to direct contact and the location of the lesion.  Patient was advised adjunct with OTC NSAIDs for pain relief  5. Patient will decrease the amount of barefoot walking continue with comfortable shoe gear both inside and outside the home.    6. Patient will follow-up in 1 week or p.r.n. for complaints

## 2023-10-27 ENCOUNTER — OFFICE VISIT (OUTPATIENT)
Dept: PODIATRY | Facility: CLINIC | Age: 68
End: 2023-10-27
Payer: MEDICARE

## 2023-10-27 VITALS
WEIGHT: 300 LBS | DIASTOLIC BLOOD PRESSURE: 81 MMHG | HEIGHT: 70 IN | BODY MASS INDEX: 42.95 KG/M2 | SYSTOLIC BLOOD PRESSURE: 152 MMHG | HEART RATE: 73 BPM

## 2023-10-27 DIAGNOSIS — L97.521 ULCER OF LEFT FOOT, LIMITED TO BREAKDOWN OF SKIN: Primary | ICD-10-CM

## 2023-10-27 DIAGNOSIS — M79.672 LEFT FOOT PAIN: ICD-10-CM

## 2023-10-27 DIAGNOSIS — B07.0 PLANTAR WART: ICD-10-CM

## 2023-10-27 PROCEDURE — 99499 NO LOS: ICD-10-PCS | Mod: S$GLB,,, | Performed by: PODIATRIST

## 2023-10-27 PROCEDURE — 97597 DBRDMT OPN WND 1ST 20 CM/<: CPT | Mod: S$GLB,,, | Performed by: PODIATRIST

## 2023-10-27 PROCEDURE — 97597 WOUND DEBRIDEMENT: ICD-10-PCS | Mod: S$GLB,,, | Performed by: PODIATRIST

## 2023-10-27 PROCEDURE — 99499 UNLISTED E&M SERVICE: CPT | Mod: S$GLB,,, | Performed by: PODIATRIST

## 2023-11-03 ENCOUNTER — OFFICE VISIT (OUTPATIENT)
Dept: PODIATRY | Facility: CLINIC | Age: 68
End: 2023-11-03
Payer: MEDICARE

## 2023-11-03 VITALS
WEIGHT: 300.63 LBS | SYSTOLIC BLOOD PRESSURE: 142 MMHG | DIASTOLIC BLOOD PRESSURE: 76 MMHG | BODY MASS INDEX: 43.13 KG/M2

## 2023-11-03 DIAGNOSIS — R26.2 DIFFICULTY WALKING: ICD-10-CM

## 2023-11-03 DIAGNOSIS — B07.0 PLANTAR WART: ICD-10-CM

## 2023-11-03 DIAGNOSIS — M79.672 LEFT FOOT PAIN: ICD-10-CM

## 2023-11-03 DIAGNOSIS — L97.521 ULCER OF LEFT FOOT, LIMITED TO BREAKDOWN OF SKIN: Primary | ICD-10-CM

## 2023-11-03 PROCEDURE — 97597 DBRDMT OPN WND 1ST 20 CM/<: CPT | Mod: S$GLB,,, | Performed by: PODIATRIST

## 2023-11-03 PROCEDURE — 99499 UNLISTED E&M SERVICE: CPT | Mod: S$GLB,,, | Performed by: PODIATRIST

## 2023-11-03 PROCEDURE — 97597 WOUND DEBRIDEMENT: ICD-10-PCS | Mod: S$GLB,,, | Performed by: PODIATRIST

## 2023-11-03 PROCEDURE — 99499 NO LOS: ICD-10-PCS | Mod: S$GLB,,, | Performed by: PODIATRIST

## 2023-11-09 NOTE — PROGRESS NOTES
"Subjective:     Patient ID: Julian Enciso is a 68 y.o. male.    Chief Complaint: Plantar Warts    Julian is a 68 y.o. male who presents to the clinic for evaluation and treatment of high risk feet. Julian has a past medical history of Hypertension, Hypothyroidism, and Thyroid disease. The patient's chief complaint is foot ulcer/wart, left 1st MPJ.         Current shoe gear:  Affected Foot: Tennis shoes     Unaffected Foot: Tennis shoes    History of Trauma: negative  Sign of Infection: none    No results found for: "HGBA1C"    Review of Systems   Constitutional: Negative for chills and fever.   Cardiovascular:  Negative for chest pain and leg swelling.   Respiratory:  Negative for cough and shortness of breath.    Gastrointestinal:  Negative for diarrhea, nausea and vomiting.        Objective:     Physical Exam  Vitals reviewed.   Constitutional:       General: He is not in acute distress.     Appearance: Normal appearance. He is not ill-appearing.   HENT:      Head: Normocephalic.      Nose: Nose normal.   Cardiovascular:      Rate and Rhythm: Normal rate.   Pulmonary:      Effort: Pulmonary effort is normal. No respiratory distress.   Skin:     Capillary Refill: Capillary refill takes 2 to 3 seconds.   Neurological:      Mental Status: He is alert and oriented to person, place, and time.   Psychiatric:         Mood and Affect: Mood normal.         Behavior: Behavior normal.         Thought Content: Thought content normal.     Dermatologic:  soft tissue mass/ Ulceration noted to left 1st digit/ MPJ measuring 3.0 x 2.0 x 0.1 cm with hyperkeratotic perimeter, dorsal medial skin margins seem to exhibit wart like characteristics with noted pinpoint bleeding but no signs of infection,  no rashes noted bilateral foot, no interdigital maceration noted bilateral foot   Vascular: DP and PT pulses palpable 1/4 bilateral foot, capillary fill time less than 3 seconds to distal digits bilateral foot   Musculoskeletal:  " 5/5 muscle strength noted bilateral foot, ankle joint range of motion is reduced without pain, pain and tenderness with palpation of the left 1st MPJ ulceration  Neurologic:  Protective and  light touch sensation intact bilateral lower extremity      Assessment:      Encounter Diagnoses   Name Primary?    Ulcer of left foot, limited to breakdown of skin Yes    Plantar wart     Left foot pain      Plan:     Julian was seen today for plantar warts.    Diagnoses and all orders for this visit:    Ulcer of left foot, limited to breakdown of skin  -     Wound Debridement    Plantar wart  -     Wound Debridement    Left foot pain      I counseled the patient on his conditions, their implications and medical management.           1. Patient was examined and evaluated.    2. Again Discussed with patient presence of verruca changes as well as ulceration to soft tissue as left 1st MPJ.  Following debridement of the lesion, topical salicylic acid was applied.  Patient will keep this d3. Patient was dispensed offloading pads for prevention of direct pressure to lesion.  Wound Debridement    Date/Time: 10/13/2023 8:30 AM    Performed by: Viry Anthony DPM  Authorized by: Viry Anthony DPM    Consent Done?:  Yes (Verbal)    Wound Details:    Location:  Left foot    Location:  Left 1st Metatarsal Head       Length (cm):  3       Area (sq cm):  6       Width (cm):  2       Percent Debrided (%):  100       Depth (cm):  0.1       Total Area Debrided (sq cm):  6    Depth of debridement:  Epidermis/Dermis    Devitalized tissue debrided:  Biofilm and Callus    Instruments:  Blade  Bleeding:  Minimal  Hemostasis Achieved: Yes  Method Used:  Pressure  Patient tolerance:  Patient tolerated the procedure well with no immediate complications  1st Wound Pain Assessment: 2      4. Discussed with patient potential for recurrence of verruca over time.  Patient was made aware that pain seems to be related to direct contact and the  location of the lesion.  Patient was advised adjunct with OTC NSAIDs for pain relief  5. Patient will decrease the amount of barefoot walking continue with comfortable shoe gear both inside and outside the home.    6. Patient will follow-up in 1 week or p.r.n. for complaints ressing clean, dry, and intact for 1 to 3 days.

## 2023-11-10 ENCOUNTER — OFFICE VISIT (OUTPATIENT)
Dept: PODIATRY | Facility: CLINIC | Age: 68
End: 2023-11-10
Payer: MEDICARE

## 2023-11-10 VITALS — WEIGHT: 300 LBS | HEIGHT: 70 IN | BODY MASS INDEX: 42.95 KG/M2

## 2023-11-10 DIAGNOSIS — B07.0 PLANTAR WART: ICD-10-CM

## 2023-11-10 DIAGNOSIS — L97.521 ULCER OF LEFT FOOT, LIMITED TO BREAKDOWN OF SKIN: Primary | ICD-10-CM

## 2023-11-10 PROCEDURE — 97597 DBRDMT OPN WND 1ST 20 CM/<: CPT | Mod: S$GLB,,, | Performed by: PODIATRIST

## 2023-11-10 PROCEDURE — 99499 NO LOS: ICD-10-PCS | Mod: S$GLB,,, | Performed by: PODIATRIST

## 2023-11-10 PROCEDURE — 99499 UNLISTED E&M SERVICE: CPT | Mod: S$GLB,,, | Performed by: PODIATRIST

## 2023-11-10 PROCEDURE — 97597 WOUND DEBRIDEMENT: ICD-10-PCS | Mod: S$GLB,,, | Performed by: PODIATRIST

## 2023-11-10 NOTE — PROGRESS NOTES
"Subjective:     Patient ID: Julian Enciso is a 68 y.o. male.    Chief Complaint: Plantar Warts    Julian is a 68 y.o. male who presents to the clinic for evaluation and treatment of high risk feet. Julian has a past medical history of Hypertension, Hypothyroidism, and Thyroid disease. The patient's chief complaint is foot ulcer, left 1st MTPJ.  Patient reports improved appearance of left 1st MPJ.  Patient states pain complaints are also improving.  Patient states that he has been applying salicylic acid treatments to the area at home after removal of applied dressings from the clinic.        Current shoe gear:  Affected Foot: Tennis shoes     Unaffected Foot: Tennis shoes    History of Trauma: negative  Sign of Infection: none    No results found for: "HGBA1C"    Review of Systems   Constitutional: Negative for chills and fever.   Cardiovascular:  Negative for chest pain and leg swelling.   Respiratory:  Negative for cough and shortness of breath.    Gastrointestinal:  Negative for diarrhea, nausea and vomiting.   Neurological:  Positive for paresthesias.        Objective:     Physical Exam  Vitals reviewed.   Constitutional:       General: He is not in acute distress.     Appearance: Normal appearance. He is not ill-appearing.   HENT:      Head: Normocephalic.      Nose: Nose normal.   Cardiovascular:      Rate and Rhythm: Normal rate.   Pulmonary:      Effort: Pulmonary effort is normal. No respiratory distress.   Skin:     Capillary Refill: Capillary refill takes 2 to 3 seconds.   Neurological:      Mental Status: He is alert and oriented to person, place, and time.   Psychiatric:         Mood and Affect: Mood normal.         Behavior: Behavior normal.         Thought Content: Thought content normal.        Dermatologic:  soft tissue mass/ Ulceration noted to left 1st digit/ MPJ measuring 2.7 x 2.1 x 0.1 cm with hyperkeratotic perimeter, dorsal medial skin margins seem to exhibit wart like " characteristics with noted pinpoint bleeding but no signs of infection,  no rashes noted bilateral foot, no interdigital maceration noted bilateral foot   Vascular: DP and PT pulses palpable 1/4 bilateral foot, capillary fill time less than 3 seconds to distal digits bilateral foot   Musculoskeletal:  5/5 muscle strength noted bilateral foot, ankle joint range of motion is reduced without pain, pain and tenderness with palpation of the left 1st MPJ ulceration  Neurologic:  Protective and  light touch sensation intact bilateral lower extremity      Assessment:      Encounter Diagnoses   Name Primary?    Ulcer of left foot, limited to breakdown of skin Yes    Plantar wart     Left foot pain      Plan:     Julian was seen today for plantar warts.    Diagnoses and all orders for this visit:    Ulcer of left foot, limited to breakdown of skin  -     Wound Debridement    Plantar wart    Left foot pain      I counseled the patient on his conditions, their implications and medical management.        1. Patient was examined and evaluated.    2. Again Discussed with patient presence of verruca changes as well as ulceration to soft tissue as left 1st MPJ.  Following debridement of the lesion, topical salicylic acid was applied.  Patient will keep this dressing clean, dry, and intact for 1 to 3 days.  Patient will reapply topical salicylic acid at home after removal of current dressing and leave it intact for 1-3 days.  3. Patient was dispensed offloading pads for prevention of direct pressure to lesion.  Wound Debridement    Date/Time: 10/27/2023 8:45 AM    Performed by: Viry Anthony DPM  Authorized by: Viry Anthony DPM    Consent Done?:  Yes (Verbal)    Wound Details:    Location:  Left foot    Location:  Left 1st Metatarsal Head       Length (cm):  2.7       Area (sq cm):  5.67       Width (cm):  2.1       Percent Debrided (%):  100       Depth (cm):  0.1       Total Area Debrided (sq cm):  5.67    Depth of  debridement:  Epidermis/Dermis    Devitalized tissue debrided:  Biofilm and Callus    Instruments:  Blade  Bleeding:  Minimal  Hemostasis Achieved: Yes  Method Used:  Pressure  Patient tolerance:  Patient tolerated the procedure well with no immediate complications  1st Wound Pain Assessment: 2      4. Discussed with patient potential for recurrence of verruca over time.  Patient was made aware that pain seems to be related to direct contact and the location of the lesion.  Patient was advised adjunct with OTC NSAIDs for pain relief  5. Patient will decrease the amount of barefoot walking continue with comfortable shoe gear both inside and outside the home.    6. Patient will follow-up in 1 week or p.r.n. for complaints

## 2023-11-17 ENCOUNTER — OFFICE VISIT (OUTPATIENT)
Dept: PODIATRY | Facility: CLINIC | Age: 68
End: 2023-11-17
Payer: MEDICARE

## 2023-11-17 VITALS — HEIGHT: 70 IN | BODY MASS INDEX: 42.95 KG/M2 | WEIGHT: 300 LBS

## 2023-11-17 DIAGNOSIS — L97.521 ULCER OF LEFT FOOT, LIMITED TO BREAKDOWN OF SKIN: Primary | ICD-10-CM

## 2023-11-17 DIAGNOSIS — M79.672 LEFT FOOT PAIN: ICD-10-CM

## 2023-11-17 PROCEDURE — 97597 WOUND DEBRIDEMENT: ICD-10-PCS | Mod: S$GLB,,, | Performed by: PODIATRIST

## 2023-11-17 PROCEDURE — 97597 DBRDMT OPN WND 1ST 20 CM/<: CPT | Mod: S$GLB,,, | Performed by: PODIATRIST

## 2023-11-17 PROCEDURE — 99499 UNLISTED E&M SERVICE: CPT | Mod: S$GLB,,, | Performed by: PODIATRIST

## 2023-11-17 PROCEDURE — 99499 NO LOS: ICD-10-PCS | Mod: S$GLB,,, | Performed by: PODIATRIST

## 2023-11-22 ENCOUNTER — OFFICE VISIT (OUTPATIENT)
Dept: PODIATRY | Facility: CLINIC | Age: 68
End: 2023-11-22
Payer: MEDICARE

## 2023-11-22 VITALS — WEIGHT: 300 LBS | HEIGHT: 70 IN | BODY MASS INDEX: 42.95 KG/M2

## 2023-11-22 DIAGNOSIS — L97.521 ULCER OF LEFT FOOT, LIMITED TO BREAKDOWN OF SKIN: Primary | ICD-10-CM

## 2023-11-22 DIAGNOSIS — B07.0 PLANTAR WART: ICD-10-CM

## 2023-11-22 DIAGNOSIS — M79.672 LEFT FOOT PAIN: ICD-10-CM

## 2023-11-22 PROCEDURE — 97597 WOUND DEBRIDEMENT: ICD-10-PCS | Mod: S$GLB,,, | Performed by: PODIATRIST

## 2023-11-22 PROCEDURE — 99499 NO LOS: ICD-10-PCS | Mod: S$GLB,,, | Performed by: PODIATRIST

## 2023-11-22 PROCEDURE — 97597 DBRDMT OPN WND 1ST 20 CM/<: CPT | Mod: S$GLB,,, | Performed by: PODIATRIST

## 2023-11-22 PROCEDURE — 99499 UNLISTED E&M SERVICE: CPT | Mod: S$GLB,,, | Performed by: PODIATRIST

## 2023-11-26 NOTE — PROGRESS NOTES
Subjective:     Patient ID: Julian Enciso is a 68 y.o. male.    Chief Complaint: Foot Pain and Foot Ulcer    Julian is a 68 y.o. male who presents to the clinic for evaluation and treatment of high risk feet. Julian has a past medical history of Hypertension, Hypothyroidism, and Thyroid disease. The patient's chief complaint is foot ulcer, left 1st MTPJ.  Patient reports improved appearance of left 1st MPJ.  Patient states pain complaints are also improving.  Patient states that he has been applying salicylic acid treatments to the area at home after removal of applied dressings from the clinic.       Review of Systems   Constitutional: Negative for chills and fever.   Cardiovascular:  Negative for chest pain and leg swelling.   Respiratory:  Negative for cough and shortness of breath.    Gastrointestinal:  Negative for diarrhea, nausea and vomiting.   Neurological:  Positive for numbness.        Objective:     Physical Exam  Vitals reviewed.   Constitutional:       General: He is not in acute distress.     Appearance: Normal appearance. He is not ill-appearing.   HENT:      Head: Normocephalic.      Nose: Nose normal.   Cardiovascular:      Rate and Rhythm: Normal rate.   Pulmonary:      Effort: Pulmonary effort is normal. No respiratory distress.   Skin:     Capillary Refill: Capillary refill takes 2 to 3 seconds.   Neurological:      Mental Status: He is alert and oriented to person, place, and time.   Psychiatric:         Mood and Affect: Mood normal.         Behavior: Behavior normal.         Thought Content: Thought content normal.     Dermatologic:  soft tissue mass/ Ulceration noted to left 1st digit/ MPJ measuring 2.7 x 2.1 x 0.1 cm with hyperkeratotic perimeter, dorsal medial skin margins seem to exhibit wart like characteristics with noted pinpoint bleeding but no signs of infection,  no rashes noted bilateral foot, no interdigital maceration noted bilateral foot   Vascular: DP and PT pulses  "palpable 1/4 bilateral foot, capillary fill time less than 3 seconds to distal digits bilateral foot   Musculoskeletal:  5/5 muscle strength noted bilateral foot, ankle joint range of motion is reduced without pain, pain and tenderness with palpation of the left 1st MPJ ulceration  Neurologic:  Protective and  light touch sensation intact bilateral lower extremity      Assessment:      Encounter Diagnoses   Name Primary?    Ulcer of left foot, limited to breakdown of skin Yes    Plantar wart     Left foot pain     Difficulty walking      Plan:     Julian was seen today for foot pain and foot ulcer.    Diagnoses and all orders for this visit:    Ulcer of left foot, limited to breakdown of skin  -     Wound Debridement    Plantar wart    Left foot pain    Difficulty walking      I counseled the patient on his conditions, their implications and medical management.        1. Patient was examined and evaluated.    2. Again Discussed with patient presence of verruca changes as well as ulceration to soft tissue as left 1st MPJ.  Following debridement of the lesion, topical salicylic acid was applied.  Patient will keep this dressing clean, dry, and intact for 1 to 3 days.  Patient will reapply topical salicylic acid at home after removal of current dressing and leave it intact for 1-3 days.  Wound Debridement    Date/Time: 11/3/2023 8:45 AM    Performed by: Viry Anthony DPM  Authorized by: Viry Anthony DPM    Time out: Immediately prior to procedure a "time out" was called to verify the correct patient, procedure, equipment, support staff and site/side marked as required.      Wound Details:    Location:  Left foot    Location:  Left 1st Metatarsal Head       Length (cm):  2.7       Area (sq cm):  5.67       Width (cm):  2.1       Percent Debrided (%):  100       Depth (cm):  0.1       Total Area Debrided (sq cm):  5.67    Depth of debridement:  Epidermis/Dermis    Tissue debrided:  Hypergranulation    " Devitalized tissue debrided:  Biofilm and Callus    Instruments:  Blade  Bleeding:  Minimal  Hemostasis Achieved: Yes  Method Used:  Pressure  Patient tolerance:  Patient tolerated the procedure well with no immediate complications  1st Wound Pain Assessment: 1      3. Discussed with patient potential for recurrence of ulcer/verruca over time.  Patient was made aware that pain seems to be related to direct contact and the location of the lesion.  Patient was advised adjunct with OTC NSAIDs for pain relief  5. Patient will decrease the amount of barefoot walking continue with comfortable shoe gear both inside and outside the home.    6. Patient will follow-up in 1 week or p.r.n. for complaints

## 2023-11-29 NOTE — PROGRESS NOTES
"Subjective:     Patient ID: Julian Enciso is a 68 y.o. male.    Chief Complaint: Plantar Warts    Julian is a 68 y.o. male who presents to the clinic for evaluation and treatment of high risk feet. Julian has a past medical history of Hypertension, Hypothyroidism, and Thyroid disease. The patient's chief complaint is foot ulcer, left 1st MTPJ.  Patient reports improved appearance of left 1st MPJ.  Patient states pain complaints are also improving.  Patient states that he has been applying salicylic acid treatments to the area at home after removal of applied dressings from the clinic.          Current shoe gear:  Affected Foot: Tennis shoes     Unaffected Foot: Tennis shoes    History of Trauma: negative  Sign of Infection: none    No results found for: "HGBA1C"    Review of Systems   Constitutional: Negative for chills and fever.   Cardiovascular:  Negative for chest pain and leg swelling.   Respiratory:  Negative for cough and shortness of breath.    Gastrointestinal:  Negative for diarrhea, nausea and vomiting.   Neurological:  Positive for paresthesias.        Objective:     Physical Exam  Vitals reviewed.   Constitutional:       General: He is not in acute distress.     Appearance: Normal appearance. He is not ill-appearing.   HENT:      Head: Normocephalic.      Nose: Nose normal.   Cardiovascular:      Rate and Rhythm: Normal rate.   Pulmonary:      Effort: Pulmonary effort is normal. No respiratory distress.   Skin:     Capillary Refill: Capillary refill takes 2 to 3 seconds.   Neurological:      Mental Status: He is alert and oriented to person, place, and time.   Psychiatric:         Mood and Affect: Mood normal.         Behavior: Behavior normal.         Thought Content: Thought content normal.       Dermatologic:  soft tissue mass/ Ulceration noted to left 1st digit/ MPJ measuring 2.5 x 2.0 x 0.1 cm with hyperkeratotic perimeter, dorsal medial skin margins seem to exhibit wart like " characteristics with noted pinpoint bleeding but no signs of infection,  no rashes noted bilateral foot, no interdigital maceration noted bilateral foot   Vascular: DP and PT pulses palpable 1/4 bilateral foot, capillary fill time less than 3 seconds to distal digits bilateral foot   Musculoskeletal:  5/5 muscle strength noted bilateral foot, ankle joint range of motion is reduced without pain, pain and tenderness with palpation of the left 1st MPJ ulceration  Neurologic:  Protective and  light touch sensation intact bilateral lower extremity    Assessment:      Encounter Diagnoses   Name Primary?    Ulcer of left foot, limited to breakdown of skin Yes    Left foot pain      Plan:     Julian was seen today for plantar warts.    Diagnoses and all orders for this visit:    Ulcer of left foot, limited to breakdown of skin  -     Wound Debridement    Left foot pain  -     Wound Debridement      I counseled the patient on his conditions, their implications and medical management.          1. Patient was examined and evaluated.    2. Again Discussed with patient presence of verruca changes as well as ulceration to soft tissue as left 1st MPJ.  Following debridement of the lesion, topical salicylic acid was applied.  Patient will keep this dressing clean, dry, and intact for 1 to 3 days.  Patient will reapply topical salicylic acid at home after removal of current dressing and leave it intact for 1-3 days.  Wound Debridement    Date/Time: 11/17/2023 10:15 AM    Performed by: Viry Anthony DPM  Authorized by: Viry Anthony DPM    Consent Done?:  Yes (Verbal)    Wound Details:    Location:  Left foot    Location:  Left 1st Metatarsal Head       Length (cm):  2.5       Area (sq cm):  5       Width (cm):  2       Percent Debrided (%):  100       Depth (cm):  0.1       Total Area Debrided (sq cm):  5    Depth of debridement:  Epidermis/Dermis    Tissue debrided:  Hypergranulation    Devitalized tissue debrided:   Callus    Instruments:  Blade  Bleeding:  Minimal  Hemostasis Achieved: Yes  Method Used:  Pressure  Patient tolerance:  Patient tolerated the procedure well with no immediate complications  1st Wound Pain Assessment: 1      3. Discussed with patient potential for recurrence of ulcer/verruca over time.  Patient was made aware that pain seems to be related to direct contact and the location of the lesion.  Patient was advised adjunct with OTC NSAIDs for pain relief  4. Patient will decrease the amount of barefoot walking continue with comfortable shoe gear both inside and outside the home.    5. Patient will follow-up in 1 week or p.r.n. for complaints

## 2023-11-29 NOTE — PROGRESS NOTES
Subjective:     Patient ID: Julian Enciso is a 68 y.o. male.    Chief Complaint: Plantar Warts    Julian is a 68 y.o. male who presents to the clinic for evaluation and treatment of high risk feet. Julian has a past medical history of Hypertension, Hypothyroidism, and Thyroid disease. The patient's chief complaint is foot ulcer, left 1st MTPJ.  Patient reports improved appearance of left 1st MPJ.  Patient states pain complaints are also improving.  Patient states that he has been applying salicylic acid treatments to the area at home after removal of applied dressings from the clinic.         Current shoe gear:  Affected Foot: Tennis shoes     Unaffected Foot: Tennis shoes    History of Trauma: negative  Sign of Infection: none        Review of Systems   Constitutional: Negative for chills and fever.   Cardiovascular:  Negative for chest pain and leg swelling.   Respiratory:  Negative for cough and shortness of breath.    Gastrointestinal:  Negative for diarrhea, nausea and vomiting.   Neurological:  Positive for paresthesias.        Objective:     Physical Exam  Vitals reviewed.   Constitutional:       General: He is not in acute distress.     Appearance: Normal appearance. He is not ill-appearing.   HENT:      Head: Normocephalic.      Nose: Nose normal.   Cardiovascular:      Rate and Rhythm: Normal rate.   Pulmonary:      Effort: Pulmonary effort is normal. No respiratory distress.   Skin:     Capillary Refill: Capillary refill takes 2 to 3 seconds.   Neurological:      Mental Status: He is alert and oriented to person, place, and time.   Psychiatric:         Mood and Affect: Mood normal.         Behavior: Behavior normal.         Thought Content: Thought content normal.       Dermatologic:  soft tissue mass/ Ulceration noted to left 1st digit/ MPJ measuring 2.5 x 2.0 x 0.1 cm with hyperkeratotic perimeter, dorsal medial skin margins seem to exhibit wart like characteristics with noted pinpoint  bleeding but no signs of infection,  no rashes noted bilateral foot, no interdigital maceration noted bilateral foot   Vascular: DP and PT pulses palpable 1/4 bilateral foot, capillary fill time less than 3 seconds to distal digits bilateral foot   Musculoskeletal:  5/5 muscle strength noted bilateral foot, ankle joint range of motion is reduced without pain, pain and tenderness with palpation of the left 1st MPJ ulceration  Neurologic:  Protective and  light touch sensation intact bilateral lower extremity    Assessment:      Encounter Diagnoses   Name Primary?    Ulcer of left foot, limited to breakdown of skin Yes    Plantar wart      Plan:     Julian was seen today for plantar warts.    Diagnoses and all orders for this visit:    Ulcer of left foot, limited to breakdown of skin  -     Wound Debridement    Plantar wart      I counseled the patient on his conditions, their implications and medical management.          1. Patient was examined and evaluated.    2. Again Discussed with patient presence of verruca changes as well as ulceration to soft tissue as left 1st MPJ.  Following debridement of the lesion, topical salicylic acid was applied.  Patient will keep this dressing clean, dry, and intact for 1 to 3 days.  Patient will reapply topical salicylic acid at home after removal of current dressing and leave it intact for 1-3 days.  Wound Debridement    Date/Time: 11/10/2023 9:15 AM    Performed by: Viry Anthony DPM  Authorized by: Viry Anthony DPM    Consent Done?:  Yes (Verbal)    Wound Details:    Location:  Left foot    Location:  Left 1st Metatarsal Head       Length (cm):  2.5       Area (sq cm):  5       Width (cm):  2       Percent Debrided (%):  100       Depth (cm):  0.1       Total Area Debrided (sq cm):  5    Depth of debridement:  Epidermis/Dermis    Tissue debrided:  Hypergranulation    Devitalized tissue debrided:  Biofilm and Callus    Instruments:  Blade  Bleeding:   Minimal  Hemostasis Achieved: Yes  Method Used:  Pressure  Patient tolerance:  Patient tolerated the procedure well with no immediate complications  1st Wound Pain Assessment: 0      3. Discussed with patient potential for recurrence of ulcer/verruca over time.  Patient was made aware that pain seems to be related to direct contact and the location of the lesion.  Patient was advised adjunct with OTC NSAIDs for pain relief  4. Patient will decrease the amount of barefoot walking continue with comfortable shoe gear both inside and outside the home.    5. Patient will follow-up in 1 week or p.r.n. for complaints

## 2023-12-01 ENCOUNTER — OFFICE VISIT (OUTPATIENT)
Dept: PODIATRY | Facility: CLINIC | Age: 68
End: 2023-12-01
Payer: MEDICARE

## 2023-12-01 VITALS — HEIGHT: 70 IN | WEIGHT: 300 LBS | BODY MASS INDEX: 42.95 KG/M2

## 2023-12-01 DIAGNOSIS — R26.2 DIFFICULTY WALKING: ICD-10-CM

## 2023-12-01 DIAGNOSIS — L97.521 ULCER OF LEFT FOOT, LIMITED TO BREAKDOWN OF SKIN: Primary | ICD-10-CM

## 2023-12-01 DIAGNOSIS — M79.672 LEFT FOOT PAIN: ICD-10-CM

## 2023-12-01 PROCEDURE — 99499 UNLISTED E&M SERVICE: CPT | Mod: S$GLB,,, | Performed by: PODIATRIST

## 2023-12-01 PROCEDURE — 97597 WOUND DEBRIDEMENT: ICD-10-PCS | Mod: S$GLB,,, | Performed by: PODIATRIST

## 2023-12-01 PROCEDURE — 97597 DBRDMT OPN WND 1ST 20 CM/<: CPT | Mod: S$GLB,,, | Performed by: PODIATRIST

## 2023-12-01 PROCEDURE — 99499 NO LOS: ICD-10-PCS | Mod: S$GLB,,, | Performed by: PODIATRIST

## 2023-12-08 ENCOUNTER — OFFICE VISIT (OUTPATIENT)
Dept: PODIATRY | Facility: CLINIC | Age: 68
End: 2023-12-08
Payer: MEDICARE

## 2023-12-08 VITALS — WEIGHT: 300 LBS | HEIGHT: 70 IN | BODY MASS INDEX: 42.95 KG/M2

## 2023-12-08 DIAGNOSIS — L97.521 ULCER OF LEFT FOOT, LIMITED TO BREAKDOWN OF SKIN: Primary | ICD-10-CM

## 2023-12-08 PROCEDURE — 97597 DBRDMT OPN WND 1ST 20 CM/<: CPT | Mod: S$GLB,,, | Performed by: PODIATRIST

## 2023-12-08 PROCEDURE — 99499 UNLISTED E&M SERVICE: CPT | Mod: S$GLB,,, | Performed by: PODIATRIST

## 2023-12-15 ENCOUNTER — OFFICE VISIT (OUTPATIENT)
Dept: PODIATRY | Facility: CLINIC | Age: 68
End: 2023-12-15
Payer: MEDICARE

## 2023-12-15 VITALS — HEIGHT: 70 IN | BODY MASS INDEX: 42.95 KG/M2 | WEIGHT: 300 LBS

## 2023-12-15 DIAGNOSIS — M79.672 LEFT FOOT PAIN: ICD-10-CM

## 2023-12-15 DIAGNOSIS — L97.521 ULCER OF LEFT FOOT, LIMITED TO BREAKDOWN OF SKIN: Primary | ICD-10-CM

## 2023-12-15 PROCEDURE — 99499 UNLISTED E&M SERVICE: CPT | Mod: S$GLB,,, | Performed by: PODIATRIST

## 2023-12-15 PROCEDURE — 97597 DBRDMT OPN WND 1ST 20 CM/<: CPT | Mod: S$GLB,,, | Performed by: PODIATRIST

## 2023-12-15 PROCEDURE — 99499 NO LOS: ICD-10-PCS | Mod: S$GLB,,, | Performed by: PODIATRIST

## 2023-12-15 PROCEDURE — 97597 WOUND DEBRIDEMENT: ICD-10-PCS | Mod: S$GLB,,, | Performed by: PODIATRIST

## 2023-12-15 RX ORDER — POLYETHYLENE GLYCOL 3350, SODIUM SULFATE ANHYDROUS, SODIUM BICARBONATE, SODIUM CHLORIDE, POTASSIUM CHLORIDE 236; 22.74; 6.74; 5.86; 2.97 G/4L; G/4L; G/4L; G/4L; G/4L
POWDER, FOR SOLUTION ORAL
COMMUNITY
Start: 2023-12-11

## 2023-12-15 NOTE — PROGRESS NOTES
"Subjective:     Patient ID: Julian Enciso is a 68 y.o. male.    Chief Complaint: Foot Ulcer    Julian is a 68 y.o. male who presents to the clinic for evaluation and treatment of high risk feet. Julian has a past medical history of Hypertension, Hypothyroidism, and Thyroid disease. The patient's chief complaint is foot ulcer, lest 1st MPTJ.         Current shoe gear:  Affected Foot: Tennis shoes     Unaffected Foot: Tennis shoes    History of Trauma: negative  Sign of Infection: none    No results found for: "HGBA1C"    Review of Systems   Constitutional: Negative for chills and fever.   Cardiovascular:  Negative for chest pain and leg swelling.   Respiratory:  Negative for cough and shortness of breath.    Gastrointestinal:  Negative for diarrhea, nausea and vomiting.   Neurological:  Positive for paresthesias.        Objective:     Physical Exam  Vitals reviewed.   Constitutional:       General: He is not in acute distress.     Appearance: Normal appearance. He is not ill-appearing.   HENT:      Head: Normocephalic.      Nose: Nose normal.   Cardiovascular:      Rate and Rhythm: Normal rate.   Pulmonary:      Effort: Pulmonary effort is normal. No respiratory distress.   Skin:     Capillary Refill: Capillary refill takes 2 to 3 seconds.   Neurological:      Mental Status: He is alert and oriented to person, place, and time.   Psychiatric:         Mood and Affect: Mood normal.         Behavior: Behavior normal.         Thought Content: Thought content normal.     Dermatologic:  soft tissue mass/ Ulceration noted to left 1st digit/ MPJ measuring 2.6 x 2.1x 0.1 cm with hyperkeratotic perimeter, dorsal medial skin margins seem to exhibit wart like characteristics with noted pinpoint bleeding but no signs of infection,  no rashes noted bilateral foot, no interdigital maceration noted bilateral foot   Vascular: DP and PT pulses palpable 1/4 bilateral foot, capillary fill time less than 3 seconds to distal " digits bilateral foot   Musculoskeletal:  5/5 muscle strength noted bilateral foot, ankle joint range of motion is reduced without pain, pain and tenderness with palpation of the left 1st MPJ ulceration  Neurologic:  Protective and  light touch sensation intact bilateral lower extremity      Assessment:      Encounter Diagnoses   Name Primary?    Ulcer of left foot, limited to breakdown of skin Yes    Left foot pain      Plan:     Julian was seen today for foot ulcer.    Diagnoses and all orders for this visit:    Ulcer of left foot, limited to breakdown of skin  -     Wound Debridement    Left foot pain      I counseled the patient on his conditions, their implications and medical management.        1. Patient was examined and evaluated.    2. Again Discussed with patient presence of verruca changes as well as ulceration to soft tissue as left 1st MPJ.  Following debridement of the lesion, topical salicylic acid was applied.  Patient will keep this dressing clean, dry, and intact for 1 to 3 days.  Patient will reapply topical salicylic acid at home after removal of current dressing and leave it intact for 1-3 days.  Wound Debridement    Date/Time: 12/15/2023 9:30 AM    Performed by: Viry Anthony DPM  Authorized by: Viry Anthony DPM    Consent Done?:  Yes (Verbal)    Wound Details:    Location:  Left foot    Location:  Left 1st Metatarsal Head       Length (cm):  2.6       Area (sq cm):  5.46       Width (cm):  2.1       Percent Debrided (%):  100       Depth (cm):  0.1       Total Area Debrided (sq cm):  5.46    Depth of debridement:  Epidermis/Dermis    Devitalized tissue debrided:  Biofilm and Slough    Instruments:  Blade  Bleeding:  Minimal  Hemostasis Achieved: Yes  Method Used:  Pressure  Patient tolerance:  Patient tolerated the procedure well with no immediate complications  1st Wound Pain Assessment: 2      3. Discussed with patient potential for recurrence of ulcer/verruca over time.   Patient was made aware that pain seems to be related to direct contact and the location of the lesion.  Patient was advised adjunct with OTC NSAIDs for pain relief  4. Patient will decrease the amount of barefoot walking continue with comfortable shoe gear both inside and outside the home.    5. Patient will follow-up in 2 weeks or p.r.n. for complaints

## 2023-12-19 NOTE — PROGRESS NOTES
"Subjective:     Patient ID: Julian Enciso is a 68 y.o. male.    Chief Complaint: Plantar Warts    Julian is a 68 y.o. male who presents to the clinic for evaluation and treatment of high risk feet. Julian has a past medical history of Hypertension, Hypothyroidism, and Thyroid disease. The patient's chief complaint is foot ulcer, left 1st MPTJ.         Current shoe gear:  Affected Foot: Tennis shoes     Unaffected Foot: Tennis shoes    History of Trauma: negative  Sign of Infection: none    No results found for: "HGBA1C"    Review of Systems   Constitutional: Negative for chills and fever.   Cardiovascular:  Negative for chest pain and leg swelling.   Respiratory:  Negative for cough and shortness of breath.    Gastrointestinal:  Negative for diarrhea, nausea and vomiting.   Neurological:  Positive for paresthesias.        Objective:     Physical Exam  Vitals reviewed.   Constitutional:       General: He is not in acute distress.     Appearance: Normal appearance. He is not ill-appearing.   HENT:      Head: Normocephalic.      Nose: Nose normal.   Cardiovascular:      Rate and Rhythm: Normal rate.   Pulmonary:      Effort: Pulmonary effort is normal. No respiratory distress.   Skin:     Capillary Refill: Capillary refill takes 2 to 3 seconds.   Neurological:      Mental Status: He is alert and oriented to person, place, and time.   Psychiatric:         Mood and Affect: Mood normal.         Behavior: Behavior normal.         Thought Content: Thought content normal.     Dermatologic:  soft tissue mass/ Ulceration noted to left 1st digit/ MPJ measuring 2.5 x 2.0 x 0.1 cm with hyperkeratotic perimeter, dorsal medial skin margins seem to exhibit wart like characteristics with noted pinpoint bleeding but no signs of infection,  no rashes noted bilateral foot, no interdigital maceration noted bilateral foot   Vascular: DP and PT pulses palpable 1/4 bilateral foot, capillary fill time less than 3 seconds to " distal digits bilateral foot   Musculoskeletal:  5/5 muscle strength noted bilateral foot, ankle joint range of motion is reduced without pain, pain and tenderness with palpation of the left 1st MPJ ulceration  Neurologic:  Protective and  light touch sensation intact bilateral lower extremity      Assessment:      Encounter Diagnoses   Name Primary?    Ulcer of left foot, limited to breakdown of skin Yes    Plantar wart     Left foot pain      Plan:     Julian was seen today for plantar warts.    Diagnoses and all orders for this visit:    Ulcer of left foot, limited to breakdown of skin  -     Wound Debridement    Plantar wart    Left foot pain      I counseled the patient on his conditions, their implications and medical management.        1. Patient was examined and evaluated.    2. Again Discussed with patient presence of verruca changes as well as ulceration to soft tissue as left 1st MPJ.  Following debridement of the lesion, topical salicylic acid was applied.  Patient will keep this dressing clean, dry, and intact for 1 to 3 days.  Patient will reapply topical salicylic acid at home after removal of current dressing and leave it intact for 1-3 days.  Wound Debridement    Date/Time: 11/22/2023 8:45 AM    Performed by: Viry Anthony DPM  Authorized by: Viry Anthony DPM    Consent Done?:  Yes (Verbal)    Wound Details:    Location:  Left foot    Location:  Left 1st Metatarsal Head       Length (cm):  2.5       Area (sq cm):  5       Width (cm):  2       Percent Debrided (%):  100       Depth (cm):  0.1       Total Area Debrided (sq cm):  5    Depth of debridement:  Epidermis/Dermis    Devitalized tissue debrided:  Biofilm, Callus and Slough    Instruments:  Blade  Bleeding:  Minimal  Hemostasis Achieved: Yes  Method Used:  Pressure  Patient tolerance:  Patient tolerated the procedure well with no immediate complications  1st Wound Pain Assessment: 2      3. Discussed with patient potential for  recurrence of ulcer/verruca over time.  Patient was made aware that pain seems to be related to direct contact and the location of the lesion.  Patient was advised adjunct with OTC NSAIDs for pain relief  4. Patient will decrease the amount of barefoot walking continue with comfortable shoe gear both inside and outside the home.    5. Patient will follow-up in 1 week or p.r.n. for complaints

## 2023-12-30 NOTE — PROGRESS NOTES
"Subjective:     Patient ID: Julian Enciso is a 68 y.o. male.    Chief Complaint: Plantar Warts    Julian is a 68 y.o. male who presents to the clinic for evaluation and treatment of high risk feet. Julian has a past medical history of Hypertension, Hypothyroidism, and Thyroid disease. The patient's chief complaint is foot ulcer, left 1st MPJ.         Current shoe gear:  Affected Foot: Tennis shoes     Unaffected Foot: Tennis shoes    History of Trauma: negative  Sign of Infection: none    No results found for: "HGBA1C"    Review of Systems   Constitutional: Negative for chills and fever.   Cardiovascular:  Negative for chest pain and leg swelling.   Respiratory:  Negative for cough and shortness of breath.    Gastrointestinal:  Negative for diarrhea, nausea and vomiting.   Neurological:  Positive for paresthesias.        Objective:     Physical Exam  Vitals reviewed.   Constitutional:       General: He is not in acute distress.     Appearance: Normal appearance. He is not ill-appearing.   HENT:      Head: Normocephalic.      Nose: Nose normal.   Cardiovascular:      Rate and Rhythm: Normal rate.   Pulmonary:      Effort: Pulmonary effort is normal. No respiratory distress.   Skin:     Capillary Refill: Capillary refill takes 2 to 3 seconds.   Neurological:      Mental Status: He is alert and oriented to person, place, and time.   Psychiatric:         Mood and Affect: Mood normal.         Behavior: Behavior normal.         Thought Content: Thought content normal.     Dermatologic:  soft tissue mass/ Ulceration noted to left 1st digit/ MPJ measuring 2.5 x 2.0 x 0.1 cm with hyperkeratotic perimeter, dorsal medial skin margins seem to exhibit wart like characteristics with noted pinpoint bleeding but no signs of infection,  no rashes noted bilateral foot, no interdigital maceration noted bilateral foot   Vascular: DP and PT pulses palpable 1/4 bilateral foot, capillary fill time less than 3 seconds to " distal digits bilateral foot   Musculoskeletal:  5/5 muscle strength noted bilateral foot, ankle joint range of motion is reduced without pain, pain and tenderness with palpation of the left 1st MPJ ulceration  Neurologic:  Protective and  light touch sensation intact bilateral lower extremity      Assessment:      Encounter Diagnoses   Name Primary?    Ulcer of left foot, limited to breakdown of skin Yes    Left foot pain     Difficulty walking      Plan:     Julian was seen today for plantar warts.    Diagnoses and all orders for this visit:    Ulcer of left foot, limited to breakdown of skin  -     Wound Debridement    Left foot pain    Difficulty walking      I counseled the patient on his conditions, their implications and medical management.        1. Patient was examined and evaluated.    2. Again Discussed with patient presence of verruca changes as well as ulceration to soft tissue as left 1st MPJ.  Following debridement of the lesion, topical salicylic acid was applied.  Patient will keep this dressing clean, dry, and intact for 1 to 3 days.  Patient will reapply topical salicylic acid at home after removal of current dressing and leave it intact for 1-3 days.  Wound Debridement    Date/Time: 12/1/2023 8:30 AM    Performed by: Viry Anthony DPM  Authorized by: Viry Anthony DPM    Consent Done?:  Yes (Verbal)    Wound Details:    Location:  Left foot    Location:  Left 1st Metatarsal Head       Length (cm):  2       Area (sq cm):  4       Width (cm):  2       Percent Debrided (%):  100       Depth (cm):  0.1       Total Area Debrided (sq cm):  4    Depth of debridement:  Epidermis/Dermis    Tissue debrided:  Hypergranulation    Devitalized tissue debrided:  Callus and Slough    Instruments:  Blade  Bleeding:  Minimal  Hemostasis Achieved: Yes  Method Used:  Pressure  Patient tolerance:  Patient tolerated the procedure well with no immediate complications  1st Wound Pain Assessment: 4     3.  Discussed with patient potential for recurrence of ulcer/verruca over time.  Patient was made aware that pain seems to be related to direct contact and the location of the lesion.  Patient was advised adjunct with OTC NSAIDs for pain relief  4. Patient will decrease the amount of barefoot walking continue with comfortable shoe gear both inside and outside the home.    5. Patient will follow-up in 1 week or p.r.n. for complaints

## 2024-01-08 ENCOUNTER — OFFICE VISIT (OUTPATIENT)
Dept: PODIATRY | Facility: CLINIC | Age: 69
End: 2024-01-08
Payer: MEDICARE

## 2024-01-08 VITALS — HEIGHT: 70 IN | BODY MASS INDEX: 40.09 KG/M2 | WEIGHT: 280 LBS

## 2024-01-08 DIAGNOSIS — B07.0 PLANTAR WART: ICD-10-CM

## 2024-01-08 DIAGNOSIS — R26.2 DIFFICULTY WALKING: ICD-10-CM

## 2024-01-08 DIAGNOSIS — L97.521 ULCER OF LEFT FOOT, LIMITED TO BREAKDOWN OF SKIN: Primary | ICD-10-CM

## 2024-01-08 DIAGNOSIS — M79.672 LEFT FOOT PAIN: ICD-10-CM

## 2024-01-08 PROCEDURE — 99499 UNLISTED E&M SERVICE: CPT | Mod: S$GLB,,, | Performed by: PODIATRIST

## 2024-01-08 PROCEDURE — 97597 DBRDMT OPN WND 1ST 20 CM/<: CPT | Mod: S$GLB,,, | Performed by: PODIATRIST

## 2024-01-08 RX ORDER — DOCUSATE SODIUM 100 MG/1
100 CAPSULE, LIQUID FILLED ORAL 2 TIMES DAILY PRN
COMMUNITY
Start: 2023-12-21

## 2024-01-08 RX ORDER — HYDROCODONE BITARTRATE AND ACETAMINOPHEN 5; 325 MG/1; MG/1
1 TABLET ORAL EVERY 6 HOURS PRN
COMMUNITY
Start: 2023-12-21

## 2024-01-08 NOTE — PROGRESS NOTES
"Subjective:     Patient ID: Julian Enciso is a 68 y.o. male.    Chief Complaint: Plantar Warts    Julian is a 68 y.o. male who presents to the clinic for evaluation and treatment of high risk feet. Julian has a past medical history of Hypertension, Hypothyroidism, and Thyroid disease. The patient's chief complaint is foot ulcer, left 1st MPTJ.         Current shoe gear:  Affected Foot: Tennis shoes     Unaffected Foot: Tennis shoes    History of Trauma: negative  Sign of Infection: none    No results found for: "HGBA1C"    Review of Systems   Constitutional: Negative for chills and fever.   Cardiovascular:  Negative for chest pain and leg swelling.   Respiratory:  Negative for cough and shortness of breath.    Gastrointestinal:  Negative for diarrhea, nausea and vomiting.        Objective:     Physical Exam  Vitals reviewed.   Constitutional:       General: He is not in acute distress.     Appearance: Normal appearance. He is not ill-appearing.   HENT:      Head: Normocephalic.      Nose: Nose normal.   Cardiovascular:      Rate and Rhythm: Normal rate.   Pulmonary:      Effort: Pulmonary effort is normal. No respiratory distress.   Skin:     Capillary Refill: Capillary refill takes 2 to 3 seconds.   Neurological:      Mental Status: He is alert and oriented to person, place, and time.   Psychiatric:         Mood and Affect: Mood normal.         Behavior: Behavior normal.         Thought Content: Thought content normal.     Dermatologic:  soft tissue mass/ Ulceration noted to left 1st digit/ MPJ measuring 2.5 x 2.1 x 0.1 cm with hyperkeratotic perimeter, dorsal medial skin margins seem to exhibit wart like characteristics with noted pinpoint bleeding but no signs of infection,  no rashes noted bilateral foot, no interdigital maceration noted bilateral foot   Vascular: DP and PT pulses palpable 1/4 bilateral foot, capillary fill time less than 3 seconds to distal digits bilateral foot   Musculoskeletal: "  5/5 muscle strength noted bilateral foot, ankle joint range of motion is reduced without pain, pain and tenderness with palpation of the left 1st MPJ ulceration  Neurologic:  Protective and  light touch sensation intact bilateral lower extremity      Assessment:          Plan:     Julian was seen today for plantar warts.    Diagnoses and all orders for this visit:    Ulcer of left foot, limited to breakdown of skin  -     Wound Debridement      I counseled the patient on his conditions, their implications and medical management.        1. Patient was examined and evaluated.    2. Again Discussed with patient presence of verruca changes as well as ulceration to soft tissue as left 1st MPJ.  Following debridement of the lesion, topical salicylic acid was applied.  Patient will keep this dressing clean, dry, and intact for 1 to 3 days.  Patient will reapply topical salicylic acid at home after removal of current dressing and leave it intact for 1-3 days.  Wound Debridement    Date/Time: 12/8/2023 8:30 AM    Performed by: Viry Anthony DPM  Authorized by: Viry Anthony DPM    Consent Done?:  Yes (Verbal)    Wound Details:    Location:  Left foot    Location:  Left 1st Metatarsal Head       Length (cm):  2.5       Area (sq cm):  5.25       Width (cm):  2.1       Percent Debrided (%):  100       Depth (cm):  0.1       Total Area Debrided (sq cm):  5.25    Depth of debridement:  Epidermis/Dermis    Devitalized tissue debrided:  Slough    Instruments:  Blade  Bleeding:  Moderate  Hemostasis Achieved: Yes  Method Used:  Pressure  1st Wound Pain Assessment: 0      3. Discussed with patient potential for recurrence of ulcer/verruca over time.  Patient was made aware that pain seems to be related to direct contact and the location of the lesion.  Patient was advised adjunct with OTC NSAIDs for pain relief  4. Patient will decrease the amount of barefoot walking continue with comfortable shoe gear both inside and  outside the home.    5. Patient will follow-up in 1 week or p.r.n. for complaints

## 2024-01-24 ENCOUNTER — OFFICE VISIT (OUTPATIENT)
Dept: PODIATRY | Facility: CLINIC | Age: 69
End: 2024-01-24
Payer: MEDICARE

## 2024-01-24 VITALS — WEIGHT: 280 LBS | BODY MASS INDEX: 40.09 KG/M2 | HEIGHT: 70 IN

## 2024-01-24 DIAGNOSIS — M79.672 LEFT FOOT PAIN: ICD-10-CM

## 2024-01-24 DIAGNOSIS — L97.521 ULCER OF LEFT FOOT, LIMITED TO BREAKDOWN OF SKIN: Primary | ICD-10-CM

## 2024-01-24 DIAGNOSIS — B07.0 PLANTAR WART: ICD-10-CM

## 2024-01-24 PROCEDURE — 97597 DBRDMT OPN WND 1ST 20 CM/<: CPT | Mod: S$GLB,,, | Performed by: PODIATRIST

## 2024-01-24 PROCEDURE — 99499 UNLISTED E&M SERVICE: CPT | Mod: S$GLB,,, | Performed by: PODIATRIST

## 2024-01-29 ENCOUNTER — TELEPHONE (OUTPATIENT)
Dept: PODIATRY | Facility: CLINIC | Age: 69
End: 2024-01-29
Payer: MEDICARE

## 2024-01-29 NOTE — TELEPHONE ENCOUNTER
----- Message from Queta Styles sent at 1/29/2024  8:16 AM CST -----  Contact: PT  Type: Needs Medical Advice    Who Called: PT   Best Call Back Number:[phone]  Additional  Information: PT requesting a call back regarding  some questions he has about his upcoming appt on 3/8/24  Please Advise- Thank you

## 2024-01-29 NOTE — TELEPHONE ENCOUNTER
Spoke with Pt's wife and pt. wanted to know if there was a mistake on his 3/8/24 appt. He was last seen 1/24/24 and stated that he usually has an appt. Every 2 weeks. I did go ahead and make a sooner appt, 2/8/24, for patient. Pt also wanted to keep the appt for 3/8/24 and said he would cancel if needed.

## 2024-02-04 NOTE — PROGRESS NOTES
Subjective:     Patient ID: Julian Enciso is a 68 y.o. male.    Chief Complaint: Foot Ulcer    Julian is a 68 y.o. male who presents to the clinic for evaluation and treatment of high risk feet. Julian has a past medical history of Hypertension, Hypothyroidism, and Thyroid disease. The patient's chief complaint is foot ulcer, left 1st MPJ.     PCP: No, Primary Doctor        Current shoe gear:  Affected Foot: Tennis shoes     Unaffected Foot: Tennis shoes    History of Trauma: negative  Sign of Infection: none    Hemoglobin A1C   Date Value Ref Range Status   12/21/2023 5.3 <5.7 % Final     Comment:       Prediabetic: 5.7 - 6.4 %  Diabetic: > 6.4 %       Review of Systems   Constitutional: Negative for chills and fever.   Cardiovascular:  Negative for chest pain and leg swelling.   Respiratory:  Negative for cough and shortness of breath.    Gastrointestinal:  Negative for diarrhea, nausea and vomiting.   Neurological:  Positive for numbness and paresthesias.        Objective:     Physical Exam  Vitals reviewed.   Constitutional:       General: He is not in acute distress.     Appearance: Normal appearance. He is not ill-appearing.   HENT:      Head: Normocephalic.      Nose: Nose normal.   Pulmonary:      Effort: Pulmonary effort is normal. No respiratory distress.   Skin:     Capillary Refill: Capillary refill takes 2 to 3 seconds.   Neurological:      Mental Status: He is alert and oriented to person, place, and time.   Psychiatric:         Mood and Affect: Mood normal.         Behavior: Behavior normal.         Thought Content: Thought content normal.         Dermatologic:  soft tissue mass/ Ulceration noted to left 1st digit/ MPJ measuring 2.0 x 2.0 x 0.1 cm with hyperkeratotic perimeter, dorsal medial skin margins seem to exhibit wart like characteristics with noted pinpoint bleeding but no signs of infection,  no rashes noted bilateral foot, no interdigital maceration noted bilateral foot    Vascular: DP and PT pulses palpable 1/4 bilateral foot, capillary fill time less than 3 seconds to distal digits bilateral foot   Musculoskeletal:  5/5 muscle strength noted bilateral foot, ankle joint range of motion is reduced without pain, pain and tenderness with palpation of the left 1st MPJ ulceration  Neurologic:  Protective and  light touch sensation intact bilateral lower extremity       Assessment:      Encounter Diagnoses   Name Primary?    Ulcer of left foot, limited to breakdown of skin Yes    Left foot pain     Plantar wart      Plan:     Julian was seen today for foot ulcer.    Diagnoses and all orders for this visit:    Ulcer of left foot, limited to breakdown of skin  -     Wound Debridement    Left foot pain  -     Wound Debridement    Plantar wart      I counseled the patient on his conditions, their implications and medical management.        1. Patient was examined and evaluated.    2. Again Discussed with patient presence of verruca changes as well as ulceration to soft tissue as left 1st MPJ.  Following debridement of the lesion, topical salicylic acid was applied.  Patient will keep this dressing clean, dry, and intact for 1 to 3 days.  Patient will reapply topical salicylic acid at home after removal of current dressing and leave it intact for 1-3 days.  Wound Debridement    Date/Time: 1/24/2024 8:45 AM    Performed by: Viry Anthony DPM  Authorized by: Viry Anthony DPM    Consent Done?:  Yes (Verbal)    Wound Details:    Location:  Left foot    Location:  Left 1st Metatarsal Head       Length (cm):  2       Area (sq cm):  4       Width (cm):  2       Percent Debrided (%):  100       Depth (cm):  0.1       Total Area Debrided (sq cm):  4    Depth of debridement:  Epidermis/Dermis    Devitalized tissue debrided:  Callus and Slough    Instruments:  Blade  Bleeding:  Minimal  Hemostasis Achieved: Yes  Method Used:  Pressure  Patient tolerance:  Patient tolerated the procedure  well with no immediate complications  1st Wound Pain Assessment: 0      3. Discussed with patient potential for recurrence of ulcer/verruca over time.  Patient was made aware that pain seems to be related to direct contact and the location of the lesion.  Patient was advised adjunct with OTC NSAIDs for pain relief  4. Patient will decrease the amount of barefoot walking continue with comfortable shoe gear both inside and outside the home.    5. Patient will follow-up in 2 weeks or p.r.n. for complaints

## 2024-02-08 ENCOUNTER — OFFICE VISIT (OUTPATIENT)
Dept: PODIATRY | Facility: CLINIC | Age: 69
End: 2024-02-08
Payer: MEDICARE

## 2024-02-08 VITALS — BODY MASS INDEX: 38.8 KG/M2 | WEIGHT: 271 LBS | HEIGHT: 70 IN

## 2024-02-08 DIAGNOSIS — M79.672 LEFT FOOT PAIN: ICD-10-CM

## 2024-02-08 DIAGNOSIS — L84 CORN OR CALLUS: ICD-10-CM

## 2024-02-08 DIAGNOSIS — L97.521 ULCER OF LEFT FOOT, LIMITED TO BREAKDOWN OF SKIN: Primary | ICD-10-CM

## 2024-02-08 PROCEDURE — 97597 DBRDMT OPN WND 1ST 20 CM/<: CPT | Mod: S$GLB,,, | Performed by: PODIATRIST

## 2024-02-08 PROCEDURE — 99499 UNLISTED E&M SERVICE: CPT | Mod: S$GLB,,, | Performed by: PODIATRIST

## 2024-02-08 NOTE — PROGRESS NOTES
Subjective:     Patient ID: Julian Enciso is a 68 y.o. male.    Chief Complaint: Callouses and Plantar Warts    Julian is a 68 y.o. male who presents to the clinic for evaluation and treatment of high risk feet. Julian has a past medical history of Hypertension, Hypothyroidism, and Thyroid disease. The patient's chief complaint is foot ulcer, left 1st MPJ.  Patient also complains of irritation from callus at the right 1st digit MPJ and IPJ.        Current shoe gear:  Affected Foot: Tennis shoes     Unaffected Foot: Tennis shoes    History of Trauma: negative  Sign of Infection: none    Hemoglobin A1C   Date Value Ref Range Status   12/21/2023 5.3 <5.7 % Final     Comment:       Prediabetic: 5.7 - 6.4 %  Diabetic: > 6.4 %       Review of Systems   Constitutional: Negative for chills and fever.   Cardiovascular:  Negative for chest pain and leg swelling.   Respiratory:  Negative for cough and shortness of breath.    Gastrointestinal:  Negative for diarrhea, nausea and vomiting.        Objective:     Physical Exam  Vitals reviewed.   Constitutional:       General: He is not in acute distress.     Appearance: Normal appearance. He is not ill-appearing.   HENT:      Head: Normocephalic.      Nose: Nose normal.   Cardiovascular:      Rate and Rhythm: Normal rate.   Pulmonary:      Effort: Pulmonary effort is normal. No respiratory distress.   Skin:     Capillary Refill: Capillary refill takes 2 to 3 seconds.   Neurological:      Mental Status: He is alert and oriented to person, place, and time.   Psychiatric:         Mood and Affect: Mood normal.         Behavior: Behavior normal.         Thought Content: Thought content normal.       Dermatologic:  soft tissue mass/ Ulceration noted to left 1st digit/ MPJ measuring 2.0 x 2.5 x 0.1 cm with hyperkeratotic perimeter, dorsal medial skin margins seem to exhibit wart like characteristics with noted pinpoint bleeding but no signs of infection,  no rashes noted  bilateral foot, no interdigital maceration noted bilateral foot, hyperkeratotic skin lesion noted to the medial aspect of the right foot at the 1st MPJ and hallux IPJ   Vascular: DP and PT pulses palpable 1/4 bilateral foot, capillary fill time less than 3 seconds to distal digits bilateral foot   Musculoskeletal:  5/5 muscle strength noted bilateral foot, ankle joint range of motion is reduced without pain, pain and tenderness with palpation of the left 1st MPJ ulceration  Neurologic:  Protective and  light touch sensation intact bilateral lower extremity    Assessment:      Encounter Diagnoses   Name Primary?    Ulcer of left foot, limited to breakdown of skin Yes    Left foot pain     Corn or callus      Plan:     Julian was seen today for callouses and plantar warts.    Diagnoses and all orders for this visit:    Ulcer of left foot, limited to breakdown of skin  -     Wound Debridement    Left foot pain  -     Wound Debridement    Corn or callus      I counseled the patient on his conditions, their implications and medical management.        1. Patient was examined and evaluated.    2.  Again Discussed with patient presence of verruca changes as well as ulceration to soft tissue at left 1st MPJ.  Following debridement of the lesion, topical salicylic acid was applied.  Patient will keep this dressing clean, dry, and intact for 1 to 3 days.  Patient will reapply topical salicylic acid at home after removal of current dressing and leave it intact for 1-3 days.   Wound Debridement    Date/Time: 2/8/2024 8:45 AM    Performed by: Viry Anthony DPM  Authorized by: Viry Anthony DPM    Consent Done?:  Yes (Verbal)    Wound Details:    Location:  Left foot    Location:  Left 1st Metatarsal Head       Length (cm):  2       Area (sq cm):  5       Width (cm):  2.5       Percent Debrided (%):  100       Depth (cm):  0.1       Total Area Debrided (sq cm):  5    Depth of debridement:  Epidermis/Dermis     Devitalized tissue debrided:  Biofilm, Callus and Slough    Instruments:  Blade  Bleeding:  Minimal  Hemostasis Achieved: Yes  Method Used:  Pressure  Patient tolerance:  Patient tolerated the procedure well with no immediate complications  1st Wound Pain Assessment: 0      3. Discussed with patient etiology of callus formation.  Patient was warned of potential recurrence over time.  Patient was dispensed offloading pads for reduction of direct contact to the lesion.  Patient was advised to perform safe debridement at home with a emery board, nail file or pumice stone.  Patient was advised to apply ointments / moisturizer to the area for softening purposes.  4. Patient will decrease the amount of barefoot walking continue with comfortable shoe gear both inside and outside the home.    5. Patient will follow-up in 2 weeks or p.r.n. for complaints

## 2024-02-22 ENCOUNTER — OFFICE VISIT (OUTPATIENT)
Dept: PODIATRY | Facility: CLINIC | Age: 69
End: 2024-02-22
Payer: MEDICARE

## 2024-02-22 VITALS — BODY MASS INDEX: 38.8 KG/M2 | HEIGHT: 70 IN | WEIGHT: 271 LBS

## 2024-02-22 DIAGNOSIS — L97.521 ULCER OF LEFT FOOT, LIMITED TO BREAKDOWN OF SKIN: Primary | ICD-10-CM

## 2024-02-22 DIAGNOSIS — B07.0 PLANTAR WART: ICD-10-CM

## 2024-02-22 PROCEDURE — 97597 DBRDMT OPN WND 1ST 20 CM/<: CPT | Mod: S$GLB,,, | Performed by: PODIATRIST

## 2024-02-22 PROCEDURE — 99499 UNLISTED E&M SERVICE: CPT | Mod: S$GLB,,, | Performed by: PODIATRIST

## 2024-02-22 NOTE — PROGRESS NOTES
Subjective:     Patient ID: Julian Enciso is a 68 y.o. male.    Chief Complaint: Foot Ulcer    Julian is a 68 y.o. male who presents to the clinic for evaluation and treatment of high risk feet. Julian has a past medical history of Hypertension, Hypothyroidism, and Thyroid disease. The patient's chief complaint is foot ulcer, left 1st MPJ.         Current shoe gear:  Affected Foot: Tennis shoes     Unaffected Foot: Tennis shoes    History of Trauma: negative  Sign of Infection: none    Hemoglobin A1C   Date Value Ref Range Status   12/21/2023 5.3 <5.7 % Final     Comment:       Prediabetic: 5.7 - 6.4 %  Diabetic: > 6.4 %       Review of Systems   Constitutional: Negative for chills and fever.   Cardiovascular:  Negative for chest pain and leg swelling.   Respiratory:  Negative for cough and shortness of breath.    Gastrointestinal:  Negative for diarrhea, nausea and vomiting.   Neurological:  Positive for paresthesias.        Objective:     Physical Exam  Vitals reviewed.   Constitutional:       General: He is not in acute distress.     Appearance: Normal appearance. He is not ill-appearing.   HENT:      Head: Normocephalic.      Nose: Nose normal.   Pulmonary:      Effort: Pulmonary effort is normal. No respiratory distress.   Skin:     Capillary Refill: Capillary refill takes 2 to 3 seconds.   Neurological:      Mental Status: He is alert and oriented to person, place, and time.   Psychiatric:         Mood and Affect: Mood normal.         Behavior: Behavior normal.         Thought Content: Thought content normal.       Dermatologic:  soft tissue mass/ Ulceration noted to left 1st digit/ MPJ measuring 1.8 x 2.0 x 0.1 cm with hyperkeratotic perimeter, dorsal medial skin margins seem to exhibit wart like characteristics with noted pinpoint bleeding but no signs of infection,  no rashes noted bilateral foot, no interdigital maceration noted bilateral foot, hyperkeratotic skin lesion noted to the medial  aspect of the right foot at the 1st MPJ and hallux IPJ   Vascular: DP and PT pulses palpable 1/4 bilateral foot, capillary fill time less than 3 seconds to distal digits bilateral foot   Musculoskeletal:  5/5 muscle strength noted bilateral foot, ankle joint range of motion is reduced without pain, pain and tenderness with palpation of the left 1st MPJ ulceration  Neurologic:  Protective and  light touch sensation intact bilateral lower extremity, + paresthesias reported    Assessment:      Encounter Diagnoses   Name Primary?    Ulcer of left foot, limited to breakdown of skin Yes    Plantar wart      Plan:     Julian was seen today for foot ulcer.    Diagnoses and all orders for this visit:    Ulcer of left foot, limited to breakdown of skin  -     Wound Debridement    Plantar wart      I counseled the patient on his conditions, their implications and medical management.        1. Patient was examined and evaluated.    2.  Again Discussed with patient presence of verruca changes as well as ulceration to soft tissue at left 1st MPJ.  Following debridement of the lesion, topical salicylic acid was applied.  Patient will keep this dressing clean, dry, and intact for 1 to 3 days.    Wound Debridement    Date/Time: 2/22/2024 8:15 AM    Performed by: Viry Anthony DPM  Authorized by: Viry Anthony DPM    Consent Done?:  Yes (Verbal)    Wound Details:    Location:  Left foot    Location:  Left 1st Metatarsal Head       Length (cm):  1.8       Area (sq cm):  3.6       Width (cm):  2       Percent Debrided (%):  100       Depth (cm):  0.1       Total Area Debrided (sq cm):  3.6    Depth of debridement:  Epidermis/Dermis    Devitalized tissue debrided:  Slough, Callus and Biofilm    Instruments:  Blade  Bleeding:  Minimal  Hemostasis Achieved: Yes  Method Used:  Pressure  Patient tolerance:  Patient tolerated the procedure well with no immediate complications  1st Wound Pain Assessment: 0      3. Discussed with  patient etiology of callus formation.  Patient was warned of potential recurrence over time.  Patient was dispensed offloading pads for reduction of direct contact to the lesion.  Patient was advised to perform safe debridement at home with a emery board, nail file or pumice stone.  Patient was advised to apply ointments / moisturizer to the area for softening purposes.  4. Patient will decrease the amount of barefoot walking continue with comfortable shoe gear both inside and outside the home.    5. Patient will follow-up in 2 weeks or p.r.n. for complaints

## 2024-03-07 ENCOUNTER — OFFICE VISIT (OUTPATIENT)
Dept: PODIATRY | Facility: CLINIC | Age: 69
End: 2024-03-07
Payer: MEDICARE

## 2024-03-07 VITALS — BODY MASS INDEX: 38.8 KG/M2 | WEIGHT: 271 LBS | HEIGHT: 70 IN

## 2024-03-07 DIAGNOSIS — B07.0 PLANTAR WART: Primary | ICD-10-CM

## 2024-03-07 DIAGNOSIS — L97.521 ULCER OF LEFT FOOT, LIMITED TO BREAKDOWN OF SKIN: ICD-10-CM

## 2024-03-07 DIAGNOSIS — M79.672 LEFT FOOT PAIN: ICD-10-CM

## 2024-03-07 PROCEDURE — 17110 DESTRUCTION B9 LES UP TO 14: CPT | Mod: S$GLB,,, | Performed by: PODIATRIST

## 2024-03-07 PROCEDURE — 99499 UNLISTED E&M SERVICE: CPT | Mod: S$GLB,,, | Performed by: PODIATRIST

## 2024-03-20 NOTE — PROGRESS NOTES
Subjective:     Patient ID: Julian Enciso is a 68 y.o. male.    Chief Complaint: Plantar Warts    Julian is a 68 y.o. male who presents to the clinic for evaluation and treatment of high risk feet. Julian has a past medical history of Hypertension, Hypothyroidism, and Thyroid disease. The patient's chief complaint is foot ulcer/ wart, left 1st MPJ.         Current shoe gear:  Affected Foot: Tennis shoes     Unaffected Foot: Tennis shoes    History of Trauma: negative  Sign of Infection: none    Hemoglobin A1C   Date Value Ref Range Status   12/21/2023 5.3 <5.7 % Final     Comment:       Prediabetic: 5.7 - 6.4 %  Diabetic: > 6.4 %       Review of Systems   Constitutional: Negative for chills and fever.   Cardiovascular:  Negative for chest pain and leg swelling.   Respiratory:  Negative for cough and shortness of breath.    Gastrointestinal:  Negative for diarrhea, nausea and vomiting.   Neurological:  Positive for paresthesias.        Objective:     Physical Exam  Vitals reviewed.   Constitutional:       General: He is not in acute distress.     Appearance: Normal appearance. He is not ill-appearing.   HENT:      Head: Normocephalic.      Nose: Nose normal.   Cardiovascular:      Rate and Rhythm: Normal rate.   Pulmonary:      Effort: Pulmonary effort is normal. No respiratory distress.   Skin:     Capillary Refill: Capillary refill takes 2 to 3 seconds.   Neurological:      Mental Status: He is alert and oriented to person, place, and time.   Psychiatric:         Mood and Affect: Mood normal.         Behavior: Behavior normal.         Thought Content: Thought content normal.     Dermatologic:  soft tissue lesion/ Ulceration noted to left 1st digit/ MPJ measuring 1.5 x 1.5 x 0.1 cm with hyperkeratotic perimeter, dorsal medial skin margins seem to exhibit wart like characteristics with noted pinpoint bleeding but no signs of infection,  no rashes noted bilateral foot, no interdigital maceration noted  bilateral foot, hyperkeratotic skin lesion noted to the medial aspect of the right foot at the 1st MPJ and hallux IPJ   Vascular: DP and PT pulses palpable 1/4 bilateral foot, capillary fill time less than 3 seconds to distal digits bilateral foot   Musculoskeletal:  5/5 muscle strength noted bilateral foot, ankle joint range of motion is reduced without pain, pain and tenderness with palpation of the left 1st MPJ ulceration  Neurologic:  Protective and  light touch sensation intact bilateral lower extremity, + paresthesias reported      Assessment:      Encounter Diagnoses   Name Primary?    Ulcer of left foot, limited to breakdown of skin     Left foot pain     Plantar wart Yes     Plan:     Julian was seen today for plantar warts.    Diagnoses and all orders for this visit:    Plantar wart  -     Destruction of Benign Lesion    Ulcer of left foot, limited to breakdown of skin    Left foot pain  -     Destruction of Benign Lesion      I counseled the patient on his conditions, their implications and medical management.          1. Patient was examined and evaluated.    2.  Again Discussed with patient presence of verruca changes as well as ulceration to soft tissue at left 1st MPJ.  Following debridement of the lesion, topical salicylic acid was applied.  Patient will keep this dressing clean, dry, and intact for 1 to 3 days.    Destruction of Benign Lesion    Date/Time: 3/7/2024 8:15 AM    Performed by: Viry Anthony DPM  Authorized by: Viry Anthony DPM    Consent Done?:  Yes (Verbal)  Location:     Lower Extremity:  Foot    Detail:  Left foot  Procedure Details:     Cosmetic?: No      Number of lesions:  1    Destruction Method: debridement with salicylic acid placed under occlusion.    Sterile dressings:  Telfa gauze    Bleeding:  Minimal    Hemostasis Achieved: Yes      Method Used:  Pressure     Patient tolerated the procedure well with no immediate complications.     Post-operative instructions  were provided for the patient.     Patient was discharged and will follow up for wound check.      3. Discussed with patient etiology of callus formation.  Patient was warned of potential recurrence over time.  Patient was dispensed offloading pads for reduction of direct contact to the lesion.  Patient was advised to perform safe debridement at home with a emery board, nail file or pumice stone.  Patient was advised to apply ointments / moisturizer to the area for softening purposes.  4. Patient will decrease the amount of barefoot walking continue with comfortable shoe gear both inside and outside the home.    5. Patient will follow-up in 2 weeks or p.r.n. for complaints

## 2024-03-21 ENCOUNTER — OFFICE VISIT (OUTPATIENT)
Dept: PODIATRY | Facility: CLINIC | Age: 69
End: 2024-03-21
Payer: MEDICARE

## 2024-03-21 VITALS — HEIGHT: 70 IN | WEIGHT: 277.63 LBS | BODY MASS INDEX: 39.75 KG/M2

## 2024-03-21 DIAGNOSIS — R26.2 DIFFICULTY WALKING: ICD-10-CM

## 2024-03-21 DIAGNOSIS — M79.672 LEFT FOOT PAIN: ICD-10-CM

## 2024-03-21 DIAGNOSIS — L97.521 ULCER OF LEFT FOOT, LIMITED TO BREAKDOWN OF SKIN: Primary | ICD-10-CM

## 2024-03-21 PROCEDURE — 97597 DBRDMT OPN WND 1ST 20 CM/<: CPT | Mod: S$GLB,,, | Performed by: PODIATRIST

## 2024-03-21 PROCEDURE — 99212 OFFICE O/P EST SF 10 MIN: CPT | Mod: 25,S$GLB,, | Performed by: PODIATRIST

## 2024-03-21 NOTE — PROGRESS NOTES
Subjective:     Patient ID: Julian Enciso is a 68 y.o. male.    Chief Complaint: Plantar Warts    Julian is a 68 y.o. male who presents to the clinic for evaluation and treatment of high risk feet. Julian has a past medical history of Hypertension, Hypothyroidism, and Thyroid disease. The patient's chief complaint is foot ulcer, left 1st MPJ with warty tissue.         Current shoe gear:  Affected Foot: Tennis shoes     Unaffected Foot: Tennis shoes    History of Trauma: negative  Sign of Infection: none    Hemoglobin A1C   Date Value Ref Range Status   12/21/2023 5.3 <5.7 % Final     Comment:       Prediabetic: 5.7 - 6.4 %  Diabetic: > 6.4 %       Review of Systems   Constitutional: Negative for chills and fever.   Cardiovascular:  Negative for chest pain and leg swelling.   Respiratory:  Negative for cough and shortness of breath.    Gastrointestinal:  Negative for diarrhea, nausea and vomiting.   Neurological:  Positive for paresthesias.        Objective:     Physical Exam  Vitals reviewed.   Constitutional:       General: He is not in acute distress.     Appearance: Normal appearance. He is not ill-appearing.   HENT:      Head: Normocephalic.      Nose: Nose normal.   Pulmonary:      Effort: Pulmonary effort is normal. No respiratory distress.   Skin:     Capillary Refill: Capillary refill takes 2 to 3 seconds.   Neurological:      Mental Status: He is alert and oriented to person, place, and time.   Psychiatric:         Mood and Affect: Mood normal.         Behavior: Behavior normal.         Thought Content: Thought content normal.       Dermatologic:  soft tissue mass/ Ulceration noted to left 1st digit/ MPJ measuring 1.5  x 1.4 x 0.1 cm with hyperkeratotic perimeter, dorsal medial skin margins seem to exhibit wart like characteristics with noted pinpoint bleeding but no signs of infection,  no rashes noted bilateral foot, no interdigital maceration noted bilateral foot, hyperkeratotic skin lesion  noted to the medial aspect of the right foot at the 1st MPJ and hallux IPJ   Vascular: DP and PT pulses palpable 1/4 bilateral foot, capillary fill time less than 3 seconds to distal digits bilateral foot   Musculoskeletal:  5/5 muscle strength noted bilateral foot, ankle joint range of motion is reduced without pain, pain and tenderness with palpation of the left 1st MPJ ulceration  Neurologic:  Protective and  light touch sensation intact bilateral lower extremity    Assessment:      Encounter Diagnoses   Name Primary?    Left foot pain     Difficulty walking     Ulcer of left foot, limited to breakdown of skin Yes     Plan:     Julian was seen today for plantar warts.    Diagnoses and all orders for this visit:    Ulcer of left foot, limited to breakdown of skin  -     Wound Debridement    Left foot pain    Difficulty walking      I counseled the patient on his conditions, their implications and medical management.        1. Patient was examined and evaluated.    2. Again discussed with patient etiology of plantar foot ulceration and mixed warty skin in the area.  Patient was advised to continue with offloading pad use at home.  Patient was encouraged to decrease the amount of ambulation for prevention of recurrence of growth of callus portion to the wound.  Again discussed with patient previous biopsy results which reveal the area to have plantar verruca cells as well.  Patient made aware that we will continue with treatment of verruca with salicylic acid under occlusion as well.  Wound Debridement    Date/Time: 3/21/2024 8:45 AM    Performed by: Viry Anthony DPM  Authorized by: Viry Anthony DPM    Consent Done?:  Yes (Verbal)    Wound Details:    Location:  Left foot    Location:  Left 1st Metatarsal Head       Length (cm):  1.5       Area (sq cm):  2.1       Width (cm):  1.4       Percent Debrided (%):  100       Depth (cm):  0.1       Total Area Debrided (sq cm):  2.1    Depth of debridement:   Epidermis/Dermis    Devitalized tissue debrided:  Slough, Callus and Biofilm    Instruments:  Blade  Bleeding:  Minimal  Hemostasis Achieved: Yes  Method Used:  Pressure  Patient tolerance:  Patient tolerated the procedure well with no immediate complications  1st Wound Pain Assessment: 0      3. Patient was dispensed offloading pads for protection of the area.  Patient was advised to adjunct with OTC analgesics/NSAIDs for any pain relief necessary  4. Patient will follow-up in 2 weeks p.r.n. for complaints

## 2024-03-24 NOTE — PROGRESS NOTES
"Subjective:     Patient ID: Julian Enciso is a 68 y.o. male.    Chief Complaint: Foot Ulcer    Julian is a 68 y.o. male who presents to the clinic for evaluation and treatment of high risk feet. Julian has a past medical history of Hypertension, Hypothyroidism, and Thyroid disease. The patient's chief complaint is foot ulcer, left 1st MPJ.         Current shoe gear:  Affected Foot: Tennis shoes     Unaffected Foot: Tennis shoes    History of Trauma: negative  Sign of Infection: none    No results found for: "HGBA1C"    Review of Systems   Constitutional: Negative for chills and fever.   Cardiovascular:  Negative for chest pain and leg swelling.   Respiratory:  Negative for cough and shortness of breath.    Gastrointestinal:  Negative for diarrhea, nausea and vomiting.   Neurological:  Positive for paresthesias.        Objective:     Physical Exam  Vitals reviewed.   Constitutional:       General: He is not in acute distress.     Appearance: Normal appearance. He is not ill-appearing.   HENT:      Head: Normocephalic.      Nose: Nose normal.   Cardiovascular:      Rate and Rhythm: Normal rate.   Pulmonary:      Effort: Pulmonary effort is normal. No respiratory distress.   Skin:     Capillary Refill: Capillary refill takes 2 to 3 seconds.   Neurological:      Mental Status: He is alert and oriented to person, place, and time.   Psychiatric:         Mood and Affect: Mood normal.         Behavior: Behavior normal.         Thought Content: Thought content normal.     Dermatologic:  soft tissue mass/ Ulceration noted to left 1st digit/ MPJ measuring 2.2 x 1.8 x 0.1 cm with hyperkeratotic perimeter, dorsal medial skin margins seem to exhibit wart like characteristics with noted pinpoint bleeding but no signs of infection,  no rashes noted bilateral foot, no interdigital maceration noted bilateral foot   Vascular: DP and PT pulses palpable 1/4 bilateral foot, capillary fill time less than 3 seconds to distal digits " Dr. Argueta at bedside to staple lac    bilateral foot   Musculoskeletal:  5/5 muscle strength noted bilateral foot, ankle joint range of motion is reduced without pain, pain and tenderness with palpation of the left 1st MPJ ulceration  Neurologic:  Protective and  light touch sensation intact bilateral lower extremity      Assessment:      Encounter Diagnoses   Name Primary?    Ulcer of left foot, limited to breakdown of skin Yes    Plantar wart     Left foot pain      Plan:     Julian was seen today for foot ulcer.    Diagnoses and all orders for this visit:    Ulcer of left foot, limited to breakdown of skin  -     Wound Debridement    Plantar wart    Left foot pain      I counseled the patient on his conditions, their implications and medical management.        1. Patient was examined and evaluated.    2. Again Discussed with patient presence of verruca changes as well as ulceration to soft tissue as left 1st MPJ.  Following debridement of the lesion, topical salicylic acid was applied.  Patient will keep this dressing clean, dry, and intact for 1 to 3 days.  Wound Debridement    Date/Time: 9/14/2023 11:00 AM    Performed by: Viry Anthony DPM  Authorized by: Viry Anthony DPM    Consent Done?:  Yes (Verbal)    Wound Details:    Location:  Left foot    Location:  Left 1st Metatarsal Head       Length (cm):  2.2       Area (sq cm):  3.96       Width (cm):  1.8       Percent Debrided (%):  100       Depth (cm):  0.1       Total Area Debrided (sq cm):  3.96    Depth of debridement:  Epidermis/Dermis    Devitalized tissue debrided:  Biofilm and Callus    Instruments:  Blade  Bleeding:  Minimal  Hemostasis Achieved: Yes  Method Used:  Pressure  Patient tolerance:  Patient tolerated the procedure well with no immediate complications  1st Wound Pain Assessment: 3       3. Patient was dispensed offloading pads for prevention of direct pressure to lesion.  4. Discussed with patient potential for recurrence of verruca over time.  Patient was made  aware that pain seems to be related to direct contact and the location of the lesion.  Patient was advised adjunct with OTC NSAIDs for pain relief  5. Patient will decrease the amount of barefoot walking continue with comfortable shoe gear both inside and outside the home.    6. Patient will follow-up in 1 week or p.r.n. for complaints

## 2024-04-11 ENCOUNTER — OFFICE VISIT (OUTPATIENT)
Dept: PODIATRY | Facility: CLINIC | Age: 69
End: 2024-04-11
Payer: MEDICARE

## 2024-04-11 VITALS — HEIGHT: 70 IN | BODY MASS INDEX: 40.6 KG/M2 | WEIGHT: 283.63 LBS

## 2024-04-11 DIAGNOSIS — M79.672 LEFT FOOT PAIN: ICD-10-CM

## 2024-04-11 DIAGNOSIS — B07.0 PLANTAR WART: Primary | ICD-10-CM

## 2024-04-11 PROCEDURE — 17110 DESTRUCTION B9 LES UP TO 14: CPT | Mod: S$GLB,,, | Performed by: PODIATRIST

## 2024-04-11 PROCEDURE — 99213 OFFICE O/P EST LOW 20 MIN: CPT | Mod: 25,S$GLB,, | Performed by: PODIATRIST

## 2024-04-24 NOTE — PROGRESS NOTES
Subjective:     Patient ID: Julian Enciso is a 68 y.o. male.    Chief Complaint: Wound Care    Julian is a 68 y.o. male who presents to the podiatry clinic  with complaint of  left foot pain. Onset of the symptoms was several months ago. Precipitating event: increased activity and growth of painful wart and presence of ulceration at left 1st MPJ . Current symptoms include: ability to bear weight, but with some pain and worsening symptoms after a period of activity. Aggravating factors:  barefoot walking . Symptoms have gradually improved. Patient has had prior foot problems. Treatment to date:  routine debridement of wound and attempted destruction of wart . Patients rates pain 0/10 on pain scale.    Review of Systems   Constitutional: Negative for chills and fever.   Cardiovascular:  Positive for leg swelling. Negative for chest pain.   Respiratory:  Negative for cough and shortness of breath.    Gastrointestinal:  Negative for diarrhea, nausea and vomiting.   Neurological:  Positive for paresthesias.        Objective:     Physical Exam  Vitals reviewed.   Constitutional:       General: He is not in acute distress.     Appearance: Normal appearance. He is not ill-appearing.   HENT:      Head: Normocephalic.      Nose: Nose normal.   Pulmonary:      Effort: Pulmonary effort is normal. No respiratory distress.   Skin:     Capillary Refill: Capillary refill takes 2 to 3 seconds.   Neurological:      Mental Status: He is alert and oriented to person, place, and time.   Psychiatric:         Mood and Affect: Mood normal.         Behavior: Behavior normal.         Thought Content: Thought content normal.     Dermatologic: verrucae/soft tissue mass/ Ulceration noted to left 1st digit/ MPJ measuring 2.3 x 2.0 x 0.1 cm with hyperkeratotic perimeter, dorsal medial skin margins seem to exhibit wart like chareacteristics with noted pinpoint bleeding but no signs of infection,  no rashes noted bilateral foot, no  "interdigital maceration noted bilateral foot   Vascular: DP and PT pulses palpable 1/4 bilateral foot, capillary fill time less than 3 seconds to distal digits bilateral foot   Musculoskeletal:  5/5 muscle strength noted bilateral foot, ankle joint range of motion is reduced without pain, pain and tenderness with palpation of the left 1st MPJ ulceration  Neurologic:  Protective and  light touch sensation intact bilateral lower extremity      Assessment:      Encounter Diagnoses   Name Primary?    Plantar wart Yes    Left foot pain      Plan:     Julian Melgar" was seen today for wound care.    Diagnoses and all orders for this visit:    Plantar wart  -     Destruction of Benign Lesion    Left foot pain      I counseled the patient on his conditions, their implications and medical management.        1. Patient was examined and evaluated.    2. Patient made aware of continued presence of some plantar verruca type tissue to the left 1st MPJ region.  Discussed with patient conservative treatment options for removal of plantar warts including cryotherapy surgical excision cauterization and topical acid treatments.  Patient made aware that we will use topical salicylic acid under occlusion to help destroy the wart.  Patient was warned of potential recurrence over time.    Destruction of Benign Lesion    Date/Time: 4/11/2024 10:00 AM    Performed by: Viry Anthony DPM  Authorized by: Viry Anthony DPM    Consent Done?:  Yes (Verbal)  Location:     Lower Extremity:  Foot    Detail:  Left foot  Procedure Details:     Cosmetic?: No      Number of lesions:  1    Destruction Method: debridement with salicylic acid under occlusion.    Sterile dressings:  Gauze    Bleeding:  Minimal    Hemostasis Achieved: Yes      Method Used:  Pressure     Patient tolerated the procedure well with no immediate complications.     Post-operative instructions were provided for the patient.     Patient was discharged and will follow up " for wound check.      3. Patient will continue with local wound care to the area following removal of a today's bandage.  Patient will apply offloading pad, Betadine, and a dry sterile bandage.  Patient will decrease the amount of ambulation for reduction of pressure to area.    4. Patient will follow-up in 2 weeks or p.r.n. for complaints

## 2024-04-25 ENCOUNTER — OFFICE VISIT (OUTPATIENT)
Dept: PODIATRY | Facility: CLINIC | Age: 69
End: 2024-04-25
Payer: MEDICARE

## 2024-04-25 VITALS — WEIGHT: 279.63 LBS | BODY MASS INDEX: 40.03 KG/M2 | HEIGHT: 70 IN

## 2024-04-25 DIAGNOSIS — B07.0 PLANTAR WART: ICD-10-CM

## 2024-04-25 DIAGNOSIS — L97.511 ULCER OF RIGHT FOOT, LIMITED TO BREAKDOWN OF SKIN: Primary | ICD-10-CM

## 2024-04-25 DIAGNOSIS — R26.2 DIFFICULTY WALKING: ICD-10-CM

## 2024-04-25 PROCEDURE — 17110 DESTRUCTION B9 LES UP TO 14: CPT | Mod: S$GLB,,, | Performed by: PODIATRIST

## 2024-04-25 PROCEDURE — 97597 DBRDMT OPN WND 1ST 20 CM/<: CPT | Mod: 59,S$GLB,, | Performed by: PODIATRIST

## 2024-04-25 PROCEDURE — 99214 OFFICE O/P EST MOD 30 MIN: CPT | Mod: 25,S$GLB,, | Performed by: PODIATRIST

## 2024-05-16 NOTE — PROGRESS NOTES
Subjective:     Patient ID: Julian Enciso is a 69 y.o. male.    Chief Complaint: Foot Ulcer    Julian is a 69 y.o. male who presents to the clinic for evaluation and treatment of high risk feet. Julian has a past medical history of Hypertension, Hypothyroidism, and Thyroid disease. The patient's chief complaint is foot ulcer, both big toes.  Patient reports of the right 1st digit is mildly tender due to overgrowth of callus in the area in his concerning.  Patient reports continued treatment of left foot wart versus ulcer at home with improved appearance.        Current shoe gear:  Affected Foot: Tennis shoes     Unaffected Foot: Tennis shoes    History of Trauma: negative  Sign of Infection: none    Hemoglobin A1C   Date Value Ref Range Status   12/21/2023 5.3 <5.7 % Final     Comment:       Prediabetic: 5.7 - 6.4 %  Diabetic: > 6.4 %       Review of Systems   Constitutional: Negative for chills and fever.   Cardiovascular:  Positive for leg swelling. Negative for chest pain.   Respiratory:  Negative for cough and shortness of breath.    Gastrointestinal:  Negative for diarrhea, nausea and vomiting.   Neurological:  Positive for paresthesias.        Objective:     Physical Exam  Vitals reviewed.   Constitutional:       General: He is not in acute distress.     Appearance: Normal appearance. He is not ill-appearing.   HENT:      Head: Normocephalic.      Nose: Nose normal.   Pulmonary:      Effort: Pulmonary effort is normal. No respiratory distress.   Skin:     Capillary Refill: Capillary refill takes 2 to 3 seconds.   Neurological:      Mental Status: He is alert and oriented to person, place, and time.   Psychiatric:         Mood and Affect: Mood normal.         Behavior: Behavior normal.         Thought Content: Thought content normal.       Dermatologic: verrucae/soft tissue mass/ Ulceration noted to left 1st digit/ MPJ measuring 1.8 x 1.7 x 0.1 cm with hyperkeratotic perimeter, dorsal medial skin  "margins seem to exhibit wart like chareacteristics with noted pinpoint bleeding but no signs of infection,  no rashes noted bilateral foot, no interdigital maceration noted bilateral foot, diabetic ulcer noted to the medial aspect right 1st digit IPJ after debridement of aggressive callus in that area wound measures of 0.4 x 0.4 x 0.1 cm with a hyperkeratotic perimeter 100% granular base   Vascular: DP and PT pulses palpable 1/4 bilateral foot, capillary fill time less than 3 seconds to distal digits bilateral foot   Musculoskeletal:  5/5 muscle strength noted bilateral foot, ankle joint range of motion is reduced without pain, pain and tenderness with palpation of the left 1st MPJ ulceration  Neurologic:  Protective and  light touch sensation intact bilateral lower extremity, positive paresthesias reported to the left 1st MPJ, positive numbness reported left 1st MPJ    Assessment:      Encounter Diagnoses   Name Primary?    Ulcer of right foot, limited to breakdown of skin Yes    Plantar wart     Difficulty walking      Plan:     Julian Melgar" was seen today for foot ulcer.    Diagnoses and all orders for this visit:    Ulcer of right foot, limited to breakdown of skin  -     Wound Debridement    Plantar wart  -     Destruction of Benign Lesion    Difficulty walking  -     Wound Debridement  -     Destruction of Benign Lesion      I counseled the patient on his conditions, their implications and medical management.        1. Patient was examined and evaluated.    2. Discussed with patient new onset wound to the right 1st digit IPJ medially from overgrowth of callus in the area.  Patient was advised that the area should resolve and was mainly related to extra pressure from the callus.  Patient will continue with daily dressing changes to the area with application of triple antibiotic ointment and a dry sterile bandage.  Wound Debridement    Date/Time: 4/25/2024 10:15 AM    Performed by: Viry Anthony, " LETY  Authorized by: Viry Anthony DPM    Consent Done?:  Yes (Verbal)    Wound Details:    Location:  Right foot    Location:  Right 1st Toe       Length (cm):  0.4       Area (sq cm):  0.16       Width (cm):  0.4       Percent Debrided (%):  100       Depth (cm):  0.1       Total Area Debrided (sq cm):  0.16    Depth of debridement:  Epidermis/Dermis    Devitalized tissue debrided:  Biofilm and Callus    Instruments:  Blade  Bleeding:  None  Patient tolerance:  Patient tolerated the procedure well with no immediate complications  1st Wound Pain Assessment: 0  Destruction of Benign Lesion    Date/Time: 4/25/2024 10:15 AM    Performed by: Viry Anthony DPM  Authorized by: Viry Anthony DPM    Consent Done?:  Yes (Verbal)  Indications:     Destruction Indications: plantar wart left 1st.  Location:     Lower Extremity:  Toe    Detail:  Left big toe  Procedure Details:     Cosmetic?: No      Number of lesions:  1    Destruction Method: debridement with salicylic acid placed under occlusion.    Bleeding:  Moderate    Hemostasis Achieved: Yes      Method Used:  Pressure and Silver Nitrate     Patient tolerated the procedure well with no immediate complications.     Post-operative instructions were provided for the patient.     Patient was discharged and will follow up for wound check.      3. Discussed with patient continued presence of plantar wart versus wound to the left 1st MPJ.  Patient made aware of improvement of visual appearance of the wound and smaller wound being present.  Discussed with patient continue topical salicylic acid treatments for destruction of the wart in the area.  Patient was warned of potential recurrence over time   4. Patient was advised to decrease the amount of ambulation and ensure use of shoe gear both inside and outside the home.  5. Patient will follow-up in 2 weeks or p.r.n. for complaints

## 2024-05-23 ENCOUNTER — OFFICE VISIT (OUTPATIENT)
Dept: PODIATRY | Facility: CLINIC | Age: 69
End: 2024-05-23
Payer: MEDICARE

## 2024-05-23 VITALS — HEIGHT: 70 IN | BODY MASS INDEX: 41.23 KG/M2 | WEIGHT: 288 LBS

## 2024-05-23 DIAGNOSIS — B07.0 PLANTAR WART: ICD-10-CM

## 2024-05-23 DIAGNOSIS — L97.511 ULCER OF RIGHT FOOT, LIMITED TO BREAKDOWN OF SKIN: Primary | ICD-10-CM

## 2024-05-23 PROCEDURE — 97597 DBRDMT OPN WND 1ST 20 CM/<: CPT | Mod: S$GLB,,, | Performed by: PODIATRIST

## 2024-05-23 PROCEDURE — 99499 UNLISTED E&M SERVICE: CPT | Mod: S$GLB,,, | Performed by: PODIATRIST

## 2024-06-06 ENCOUNTER — OFFICE VISIT (OUTPATIENT)
Dept: PODIATRY | Facility: CLINIC | Age: 69
End: 2024-06-06
Payer: MEDICARE

## 2024-06-06 VITALS — WEIGHT: 288 LBS | HEIGHT: 70 IN | BODY MASS INDEX: 41.23 KG/M2

## 2024-06-06 DIAGNOSIS — L97.521 ULCER OF LEFT FOOT, LIMITED TO BREAKDOWN OF SKIN: Primary | ICD-10-CM

## 2024-06-06 DIAGNOSIS — B07.0 PLANTAR WART: ICD-10-CM

## 2024-06-06 PROCEDURE — 99499 UNLISTED E&M SERVICE: CPT | Mod: S$GLB,,, | Performed by: PODIATRIST

## 2024-06-06 PROCEDURE — 97597 DBRDMT OPN WND 1ST 20 CM/<: CPT | Mod: S$GLB,,, | Performed by: PODIATRIST

## 2024-06-17 NOTE — PROGRESS NOTES
Subjective:     Patient ID: Julian Enciso is a 69 y.o. male.    Chief Complaint: Wound Care      Julian is a 69 y.o. male who presents to the clinic for evaluation and treatment of high risk feet. Julian has a past medical history of Hypertension, Hypothyroidism, and Thyroid disease. The patient's chief complaint is foot ulcer, both big toes.  Patient reports of the right 1st digit is mildly tender due to overgrowth of callus in the area in his concerning.  Patient reports continued treatment of left foot wart versus ulcer at home with improved appearance.       Review of Systems   Constitutional: Negative for chills and fever.   Cardiovascular:  Negative for chest pain and leg swelling.   Respiratory:  Negative for cough and shortness of breath.    Gastrointestinal:  Negative for diarrhea, nausea and vomiting.   Neurological:  Positive for paresthesias.        Objective:     Physical Exam  Vitals reviewed.   Constitutional:       General: He is not in acute distress.     Appearance: Normal appearance. He is not ill-appearing.   HENT:      Head: Normocephalic.      Nose: Nose normal.   Pulmonary:      Effort: Pulmonary effort is normal. No respiratory distress.   Skin:     Capillary Refill: Capillary refill takes 2 to 3 seconds.   Neurological:      Mental Status: He is alert and oriented to person, place, and time.   Psychiatric:         Mood and Affect: Mood normal.         Behavior: Behavior normal.         Thought Content: Thought content normal.     Dermatologic: verrucae/soft tissue mass/ Ulceration noted to left 1st digit/ MPJ measuring 1.5 x 1.5 x 0.1 cm with hyperkeratotic perimeter, dorsal medial skin margins seem to exhibit wart like chareacteristics with noted pinpoint bleeding but no signs of infection,  no rashes noted bilateral foot, no interdigital maceration noted bilateral foot,   Vascular: DP and PT pulses palpable 1/4 bilateral foot, capillary fill time less than 3 seconds to distal  "digits bilateral foot   Musculoskeletal:  5/5 muscle strength noted bilateral foot, ankle joint range of motion is reduced without pain, pain and tenderness with palpation of the left 1st MPJ ulceration  Neurologic:  Protective and  light touch sensation intact bilateral lower extremity, positive paresthesias reported to the left 1st MPJ, positive numbness reported left 1st MPJ      Assessment:      Encounter Diagnoses   Name Primary?    Ulcer of right foot, limited to breakdown of skin Yes    Plantar wart      Plan:     Julian Melgar" was seen today for wound care.    Diagnoses and all orders for this visit:    Ulcer of right foot, limited to breakdown of skin  -     Wound Debridement    Plantar wart      I counseled the patient on his conditions, their implications and medical management.       1. Patient was examined and evaluated.  Wound Debridement    Date/Time: 5/23/2024 10:15 AM    Performed by: Viry Anthony DPM  Authorized by: Viry Anthony DPM    Consent Done?:  Yes (Verbal)    Wound Details:    Location:  Left foot    Location:  Left 1st Metatarsal Head       Length (cm):  1.5       Area (sq cm):  2.25       Width (cm):  1.5       Percent Debrided (%):  100       Depth (cm):  0.1       Total Area Debrided (sq cm):  2.25    Depth of debridement:  Epidermis/Dermis    Devitalized tissue debrided:  Biofilm and Callus    Instruments:  Blade  Bleeding:  Moderate  Hemostasis Achieved: Yes  Method Used:  Silver Nitrate  Patient tolerance:  Patient tolerated the procedure well with no immediate complications  1st Wound Pain Assessment: 0      2. Discussed with patient continued presence of plantar wart versus wound to the left 1st MPJ.  Patient made aware of improvement of visual appearance of the wound and smaller wound being present.  Discussed with patient continue topical salicylic acid treatments for destruction of the wart in the area.  Patient was warned of potential recurrence over time   3. " Patient was advised to decrease the amount of ambulation and ensure use of shoe gear both inside and outside the home.  4. Patient will follow-up in 2 weeks or p.r.n. for complaints

## 2024-06-26 ENCOUNTER — OFFICE VISIT (OUTPATIENT)
Dept: PODIATRY | Facility: CLINIC | Age: 69
End: 2024-06-26
Payer: MEDICARE

## 2024-06-26 VITALS — HEIGHT: 70 IN | BODY MASS INDEX: 41.23 KG/M2 | WEIGHT: 288 LBS

## 2024-06-26 DIAGNOSIS — L97.511 ULCER OF RIGHT FOOT, LIMITED TO BREAKDOWN OF SKIN: Primary | ICD-10-CM

## 2024-06-26 DIAGNOSIS — B07.0 PLANTAR WART: ICD-10-CM

## 2024-06-26 DIAGNOSIS — L97.521 ULCER OF LEFT FOOT, LIMITED TO BREAKDOWN OF SKIN: ICD-10-CM

## 2024-06-26 PROCEDURE — 99214 OFFICE O/P EST MOD 30 MIN: CPT | Mod: 25,S$GLB,, | Performed by: PODIATRIST

## 2024-06-26 PROCEDURE — 97597 DBRDMT OPN WND 1ST 20 CM/<: CPT | Mod: S$GLB,,, | Performed by: PODIATRIST

## 2024-06-30 NOTE — PROGRESS NOTES
Subjective:     Patient ID: Julian Enciso is a 69 y.o. male.    Chief Complaint: Plantar Warts    Julian is a 69 y.o. male who presents to the clinic for evaluation and treatment of high risk feet. Julian has a past medical history of Hypertension, Hypothyroidism, and Thyroid disease. The patient's chief complaint is foot ulcer, left 1st MPJ.     PCP: Aislinn, Primary Doctor        Current shoe gear:  Affected Foot: Tennis shoes     Unaffected Foot: Tennis shoes    History of Trauma: negative  Sign of Infection: none    Hemoglobin A1C   Date Value Ref Range Status   12/21/2023 5.3 <5.7 % Final     Comment:       Prediabetic: 5.7 - 6.4 %  Diabetic: > 6.4 %       Review of Systems   Constitutional: Negative for chills and fever.   Cardiovascular:  Negative for chest pain and leg swelling.   Respiratory:  Negative for cough and shortness of breath.    Gastrointestinal:  Negative for diarrhea, nausea and vomiting.   Neurological:  Positive for paresthesias.        Objective:     Physical Exam  Vitals reviewed.   Constitutional:       General: He is not in acute distress.     Appearance: Normal appearance. He is not ill-appearing.   HENT:      Head: Normocephalic.      Nose: Nose normal.   Pulmonary:      Effort: Pulmonary effort is normal. No respiratory distress.   Skin:     Capillary Refill: Capillary refill takes 2 to 3 seconds.   Neurological:      Mental Status: He is alert and oriented to person, place, and time.   Psychiatric:         Mood and Affect: Mood normal.         Behavior: Behavior normal.         Thought Content: Thought content normal.       Dermatologic: verrucae/soft tissue mass/ Ulceration noted to left 1st digit/ MPJ measuring 1.2 x 1.2 x 0.1 cm with hyperkeratotic perimeter, dorsal medial skin margins seem to exhibit wart like chareacteristics with noted pinpoint bleeding but no signs of infection,  no rashes noted bilateral foot, no interdigital maceration noted bilateral foot,  "  Vascular: DP and PT pulses palpable 1/4 bilateral foot, capillary fill time less than 3 seconds to distal digits bilateral foot   Musculoskeletal:  5/5 muscle strength noted bilateral foot, ankle joint range of motion is reduced without pain, pain and tenderness with palpation of the left 1st MPJ ulceration  Neurologic:  Protective and  light touch sensation intact bilateral lower extremity, positive paresthesias reported to the left 1st MPJ, positive numbness reported left 1st MPJ    Assessment:      Encounter Diagnoses   Name Primary?    Ulcer of left foot, limited to breakdown of skin Yes    Plantar wart      Plan:     Julian Melgar" was seen today for plantar warts.    Diagnoses and all orders for this visit:    Ulcer of left foot, limited to breakdown of skin  -     Wound Debridement    Plantar wart      I counseled the patient on his conditions, their implications and medical management.          1. Patient was examined and evaluated.   2. Discussed with patient continued presence of plantar wart versus wound to the left 1st MPJ.  Patient made aware of improvement of visual appearance of the wound and smaller wound being present.  Discussed with patient continue topical salicylic acid treatments for destruction of the wart in the area.  Patient was warned of potential recurrence over time   Wound Debridement    Date/Time: 6/6/2024 10:30 AM    Performed by: Viry Anthony DPM  Authorized by: Viry Anthony DPM    Consent Done?:  Yes (Verbal)    Wound Details:    Location:  Left foot    Location:  Left 1st Metatarsal Head       Length (cm):  1.2       Area (sq cm):  1.44       Width (cm):  1.2       Percent Debrided (%):  100       Depth (cm):  0.1       Total Area Debrided (sq cm):  1.44    Depth of debridement:  Epidermis/Dermis    Devitalized tissue debrided:  Biofilm and Callus    Instruments:  Blade  Bleeding:  Moderate  Hemostasis Achieved: Yes  Method Used:  Silver Nitrate  Patient tolerance:  " Patient tolerated the procedure well with no immediate complications  1st Wound Pain Assessment: 0      3. Patient was advised to decrease the amount of ambulation and ensure use of shoe gear both inside and outside the home.  4. Patient will follow-up in 2 weeks or p.r.n. for complaints

## 2024-07-10 ENCOUNTER — OFFICE VISIT (OUTPATIENT)
Dept: PODIATRY | Facility: CLINIC | Age: 69
End: 2024-07-10
Payer: MEDICARE

## 2024-07-10 VITALS — HEIGHT: 70 IN | WEIGHT: 287.19 LBS | BODY MASS INDEX: 41.12 KG/M2

## 2024-07-10 DIAGNOSIS — L97.521 ULCER OF LEFT FOOT, LIMITED TO BREAKDOWN OF SKIN: Primary | ICD-10-CM

## 2024-07-10 DIAGNOSIS — B07.0 PLANTAR WART: ICD-10-CM

## 2024-07-10 PROCEDURE — 99499 UNLISTED E&M SERVICE: CPT | Mod: S$GLB,,, | Performed by: PODIATRIST

## 2024-07-10 PROCEDURE — 97597 DBRDMT OPN WND 1ST 20 CM/<: CPT | Mod: S$GLB,,, | Performed by: PODIATRIST

## 2024-07-10 NOTE — PROGRESS NOTES
Subjective:     Patient ID: Julian Enciso is a 69 y.o. male.    Chief Complaint: Foot Ulcer    Julian is a 69 y.o. male who presents to the clinic for evaluation and treatment of high risk feet. Julian has a past medical history of Hypertension, Hypothyroidism, and Thyroid disease. The patient's chief complaint is foot ulcer, left 1st MPJ.     PCP: No, Primary Doctor        Current shoe gear:  Affected Foot: Tennis shoes     Unaffected Foot: Tennis shoes    History of Trauma: negative  Sign of Infection: none    Hemoglobin A1C   Date Value Ref Range Status   12/21/2023 5.3 <5.7 % Final     Comment:       Prediabetic: 5.7 - 6.4 %  Diabetic: > 6.4 %       Review of Systems   Constitutional: Negative for chills and fever.   Cardiovascular:  Negative for chest pain and leg swelling.   Respiratory:  Negative for cough and shortness of breath.    Gastrointestinal:  Negative for diarrhea, nausea and vomiting.   Neurological:  Positive for paresthesias.        Objective:     Physical Exam  Vitals reviewed.   Constitutional:       General: He is not in acute distress.     Appearance: Normal appearance. He is not ill-appearing.   HENT:      Head: Normocephalic.      Nose: Nose normal.   Pulmonary:      Effort: Pulmonary effort is normal. No respiratory distress.   Skin:     Capillary Refill: Capillary refill takes 2 to 3 seconds.   Neurological:      Mental Status: He is alert and oriented to person, place, and time.   Psychiatric:         Mood and Affect: Mood normal.         Behavior: Behavior normal.         Thought Content: Thought content normal.     Dermatologic: verrucae/soft tissue mass/ Ulceration noted to left 1st digit/ MPJ measuring 1.0 x 1.0 x 0.1 cm with hyperkeratotic perimeter, dorsal medial skin margins seem to exhibit wart like chareacteristics with noted pinpoint bleeding but no signs of infection,  no rashes noted bilateral foot, no interdigital maceration noted bilateral foot,   Vascular: DP  "and PT pulses palpable 1/4 bilateral foot, capillary fill time less than 3 seconds to distal digits bilateral foot   Musculoskeletal:  5/5 muscle strength noted bilateral foot, ankle joint range of motion is reduced without pain, pain and tenderness with palpation of the left 1st MPJ ulceration  Neurologic:  Protective and  light touch sensation intact bilateral lower extremity, positive paresthesias reported to the left 1st MPJ, positive numbness reported left 1st MPJ      Assessment:      Encounter Diagnoses   Name Primary?    Ulcer of left foot, limited to breakdown of skin Yes    Plantar wart      Plan:     Julian Melgar" was seen today for foot ulcer.    Diagnoses and all orders for this visit:    Ulcer of left foot, limited to breakdown of skin  -     Wound Debridement    Plantar wart      I counseled the patient on his conditions, their implications and medical management.        1. Patient was examined and evaluated.   2. Discussed with patient continued presence of plantar wart versus wound to the left 1st MPJ.  Patient made aware of improvement of visual appearance of the wound and smaller wound being present.  Discussed with patient continue topical salicylic acid treatments for destruction of the wart in the area.  Patient was warned of potential recurrence over time   Wound Debridement    Date/Time: 7/10/2024 11:00 AM    Performed by: Viry Anthony DPM  Authorized by: Viry Anthony DPM    Consent Done?:  Yes (Verbal)    Wound Details:    Location:  Left foot    Location:  Left 1st Metatarsal Head       Length (cm):  1       Area (sq cm):  1       Width (cm):  1       Percent Debrided (%):  100       Depth (cm):  1       Total Area Debrided (sq cm):  1    Depth of debridement:  Epidermis/Dermis    Devitalized tissue debrided:  Callus    Instruments:  Blade  Bleeding:  Moderate  Hemostasis Achieved: Yes  Method Used:  Silver Nitrate  Patient tolerance:  Patient tolerated the procedure well " with no immediate complications  1st Wound Pain Assessment: 0      3. Patient was advised to decrease the amount of ambulation and ensure use of shoe gear both inside and outside the home.  4. Patient will follow-up in 2 weeks or p.r.n. for complaints

## 2024-07-17 NOTE — PROGRESS NOTES
Subjective:     Patient ID: Julian Enciso is a 69 y.o. male.    Chief Complaint: Wound Care    Julian is a 69 y.o. male who presents to the clinic for evaluation and treatment of high risk feet. Julian has a past medical history of Hypertension, Hypothyroidism, and Thyroid disease. The patient's chief complaint is foot ulcer, bilateral 1st MPJs with right ulceration being a new onset wound.         Current shoe gear:  Affected Foot: Tennis shoes     Unaffected Foot: Tennis shoes    History of Trauma: negative  Sign of Infection: none    Hemoglobin A1C   Date Value Ref Range Status   12/21/2023 5.3 <5.7 % Final     Comment:       Prediabetic: 5.7 - 6.4 %  Diabetic: > 6.4 %       Review of Systems   Constitutional: Negative for chills and fever.   Cardiovascular:  Negative for chest pain and leg swelling.   Respiratory:  Negative for cough and shortness of breath.    Hematologic/Lymphatic: Positive for adenopathy.   Gastrointestinal:  Negative for diarrhea, nausea and vomiting.   Neurological:  Positive for numbness and paresthesias.        Objective:     Physical Exam  Vitals reviewed.   Constitutional:       General: He is not in acute distress.     Appearance: Normal appearance. He is not ill-appearing.   HENT:      Head: Normocephalic.      Nose: Nose normal.   Pulmonary:      Effort: Pulmonary effort is normal. No respiratory distress.   Skin:     Capillary Refill: Capillary refill takes 2 to 3 seconds.   Neurological:      Mental Status: He is alert and oriented to person, place, and time.   Psychiatric:         Mood and Affect: Mood normal.         Behavior: Behavior normal.         Thought Content: Thought content normal.     Dermatologic:  New onset ulceration noted beneath callus at right 1st digit MPJ ulceration measures 0.6 x 0.2 x 0.1 cm with a hyperkeratotic perimeter 100% granular base with no signs of infection, verrucae/soft tissue mass/ Ulceration noted to left 1st digit/ MPJ measuring  "1.0 x 0.7 x 0.1 cm with hyperkeratotic perimeter, dorsal medial skin margins seem to exhibit wart like chareacteristics with noted pinpoint bleeding but no signs of infection,  no rashes noted bilateral foot, no interdigital maceration noted bilateral foot,   Vascular: DP and PT pulses palpable 1/4 bilateral foot, capillary fill time less than 3 seconds to distal digits bilateral foot   Musculoskeletal:  5/5 muscle strength noted bilateral foot, ankle joint range of motion is reduced without pain, pain and tenderness with palpation of the left 1st MPJ ulceration  Neurologic:  Protective and  light touch sensation intact bilateral lower extremity, positive paresthesias reported to the left 1st MPJ, positive numbness reported left 1st MPJ      Assessment:      Encounter Diagnoses   Name Primary?    Ulcer of right foot, limited to breakdown of skin Yes    Ulcer of left foot, limited to breakdown of skin     Plantar wart      Plan:     Julian Melgar" was seen today for wound care.    Diagnoses and all orders for this visit:    Ulcer of right foot, limited to breakdown of skin  -     Wound Debridement    Ulcer of left foot, limited to breakdown of skin  -     Wound Debridement    Plantar wart      I counseled the patient on his conditions, their implications and medical management.          1. Patient was examined and evaluated.   2. Discussed with patient continued presence of plantar wart versus wound to the left 1st MPJ.  Patient made aware of improvement of visual appearance of the wound and smaller wound being present.  Discussed with patient continue topical salicylic acid treatments for destruction of the wart in the area.  Patient was warned of potential recurrence over time   Wound Debridement    Date/Time: 6/26/2024 10:30 AM    Performed by: Viry Anthony DPM  Authorized by: Viry Anthony DPM    Consent Done?:  Yes (Verbal)    Wound Details:    Location:  Left foot    Location:  Left 1st Metatarsal " Head       Length (cm):  1       Area (sq cm):  0.7       Width (cm):  0.7       Percent Debrided (%):  100       Depth (cm):  0.1       Total Area Debrided (sq cm):  0.7    Depth of debridement:  Epidermis/Dermis    Devitalized tissue debrided:  Biofilm and Callus    Instruments:  Blade  Bleeding:  Heavy  Hemostasis Achieved: Yes  Method Used:  Silver Nitrate    Additional wounds:  1    2nd Wound Details:     Location:  Right foot    Location:  Right 1st Metatarsal Head    Location:  Right 1st Metatarsal Head       Length (cm):  0.6       Area (sq cm):  0.12       Width (cm):  0.2       Percent Debrided (%):  100       Depth (cm):  0.1       Total Area Debrided (sq cm):  0.12    Depth of debridement:  Epidermis/Dermis    Devitalized tissue debrided:  Biofilm and Callus    Instruments:  Blade  Bleeding:  Minimal  Hemostasis Achieved: Yes    Method Used:  Pressure  Patient tolerance:  Patient tolerated the procedure well with no immediate complications  1st Wound Pain Assessment: 0  2nd Wound Pain Assessment: 0     3. Patient was advised to decrease the amount of ambulation and ensure use of shoe gear both inside and outside the home.  4. Patient made aware of new onset ulceration at right 1st MPJ related to overgrowth callus.  Patient will perform dressing changes to this area until it fully heals.  Patient will apply triple antibiotic ointment and a dry sterile bandage.  Patient will decrease the amount of moisture allowed to bilateral foot because of presence of ulcerations.  5. Patient will follow-up in 2 weeks or p.r.n. for complaints

## 2025-02-05 ENCOUNTER — OFFICE VISIT (OUTPATIENT)
Dept: PODIATRY | Facility: CLINIC | Age: 70
End: 2025-02-05
Payer: MEDICARE

## 2025-02-05 VITALS — HEIGHT: 70 IN | BODY MASS INDEX: 43.67 KG/M2 | WEIGHT: 305 LBS

## 2025-02-05 DIAGNOSIS — B07.0 PLANTAR WART: Primary | ICD-10-CM

## 2025-02-05 DIAGNOSIS — R26.2 DIFFICULTY WALKING: ICD-10-CM

## 2025-02-05 DIAGNOSIS — M79.672 LEFT FOOT PAIN: ICD-10-CM

## 2025-02-05 PROCEDURE — 99213 OFFICE O/P EST LOW 20 MIN: CPT | Mod: 25,S$GLB,, | Performed by: PODIATRIST

## 2025-02-05 PROCEDURE — 17110 DESTRUCTION B9 LES UP TO 14: CPT | Mod: S$GLB,,, | Performed by: PODIATRIST

## 2025-02-05 RX ORDER — SOLIFENACIN SUCCINATE 5 MG/1
5 TABLET, FILM COATED ORAL
COMMUNITY
Start: 2025-01-30

## 2025-02-05 RX ORDER — VIBEGRON 75 MG/1
TABLET, FILM COATED ORAL
COMMUNITY
Start: 2024-11-21

## 2025-02-06 NOTE — PROGRESS NOTES
Subjective:     Patient ID: Julian Enciso is a 69 y.o. male    Chief Complaint: Johnny Jordan is a 69 y.o. male who presents to the podiatry clinic  with complaint of  left foot pain. Onset of the symptoms was several years ago. Precipitating event: increased activity and growth of soft tissue lesion possible verruca versus ulcer versus callus left 1st MPJ . Current symptoms include: worsening symptoms after a period of activity. Aggravating factors:  Prolonged activity . Symptoms have progressed to a point and plateaued. Patient has had prior foot problems. Evaluation to date: lab work: abnormal prior laboratory analysis revealed portion of the lesion to be a plantar verruca . Treatment to date:  Previously attempted debridement of lesion with salicylic acid under occlusion with mild improvement noted . Patients rates pain 0/10 on pain scale.    Review of Systems   Constitutional: Negative.  Negative for chills and fever.   Respiratory:  Negative for cough and shortness of breath.    Cardiovascular:  Positive for leg swelling. Negative for chest pain.   Gastrointestinal:  Negative for diarrhea, nausea and vomiting.   Neurological:  Positive for tingling.        Objective:   Physical Exam  Vitals reviewed.   Constitutional:       General: He is not in acute distress.     Appearance: Normal appearance. He is not ill-appearing.   HENT:      Head: Normocephalic.      Nose: Nose normal.   Cardiovascular:      Pulses:           Dorsalis pedis pulses are 1+ on the right side and 1+ on the left side.        Posterior tibial pulses are 1+ on the right side and 1+ on the left side.   Pulmonary:      Effort: Pulmonary effort is normal. No respiratory distress.   Skin:     Capillary Refill: Capillary refill takes 2 to 3 seconds.   Neurological:      Mental Status: He is alert and oriented to person, place, and time.   Psychiatric:         Mood and Affect: Mood normal.         Behavior: Behavior normal.          Thought Content: Thought content normal.        Foot Exam    General  General Appearance: appears stated age and healthy   Orientation: alert and oriented to person, place, and time   Affect: appropriate   Gait: unimpaired       Right Foot/Ankle     Neurovascular  Dorsalis pedis: 1+  Posterior tibial: 1+    Muscle Strength  Ankle dorsiflexion: 5  Ankle plantar flexion: 5  Ankle inversion: 5  Ankle eversion: 5  Great toe extension: 5  Great toe flexion: 5      Left Foot/Ankle      Inspection and Palpation  Skin Exam: warts (left 1st MPJ);     Neurovascular  Dorsalis pedis: 1+  Posterior tibial: 1+    Muscle Strength  Ankle dorsiflexion: 5  Ankle plantar flexion: 5  Ankle inversion: 5  Ankle eversion: 5  Great toe extension: 5  Great toe flexion: 5           Assessment:         1. Plantar wart    2. Left foot pain    3. Difficulty walking       Plan:     Julian Lopez Zaria was seen today for   Chief Complaint   Patient presents with    Callouses       Assessment & Plan           Destruction of Benign Lesion    Date/Time: 2/5/2025 10:15 AM    Performed by: Viry Anthony DPM  Authorized by: Viry Anthony DPM    Consent Done?:  Yes (Verbal)  Indications:     Destruction Indications: Plantar verruca.  Location:     Lower Extremity:  Foot (Left 1st MPJ)    Detail:  Left foot  Procedure Details:     Cosmetic?: No      Number of lesions:  1    Destruction Method: Debridement with 15 blade with salicylic acid placed under occlusion.    Sterile dressings:  Gauze and Telfa gauze    Bleeding:  Minimal    Hemostasis Achieved: Yes      Method Used:  Pressure and Silver Nitrate     Patient tolerated the procedure well with no immediate complications.     Post-operative instructions were provided for the patient.     Patient was discharged and will follow up for wound check.       1. Patient was examined and evaluated.    2. Discussed with patient etiology of plantar verruca.  Discussed with patient several  conservative treatment options for verruca including cryotherapy, surgical excision, cauterization and debridement with topical acid treatments.  Patient was warned of potential recurrence over time.  Patient was dispensed offloading aperture pads for prevention of direct contact.    3. Patient will follow-up in 2-3 weeks or p.r.n. for complaints      Jenny Anthony DPM  [id number] [place] [address]  [phone]      This note was created using [employer] direct voice recognition software. Note may have occasional typographical errors that may not have been identified and edited despite initial review prior to signing.

## 2025-02-19 ENCOUNTER — PATIENT MESSAGE (OUTPATIENT)
Dept: INTERNAL MEDICINE | Facility: CLINIC | Age: 70
End: 2025-02-19
Payer: MEDICARE

## 2025-02-19 ENCOUNTER — OFFICE VISIT (OUTPATIENT)
Dept: PODIATRY | Facility: CLINIC | Age: 70
End: 2025-02-19
Payer: MEDICARE

## 2025-02-19 DIAGNOSIS — B07.0 PLANTAR WART: Primary | ICD-10-CM

## 2025-02-19 PROCEDURE — 99499 UNLISTED E&M SERVICE: CPT | Mod: S$GLB,,, | Performed by: PODIATRIST

## 2025-02-19 PROCEDURE — 17110 DESTRUCTION B9 LES UP TO 14: CPT | Mod: S$GLB,,, | Performed by: PODIATRIST

## 2025-03-06 ENCOUNTER — OFFICE VISIT (OUTPATIENT)
Dept: PODIATRY | Facility: CLINIC | Age: 70
End: 2025-03-06
Payer: MEDICARE

## 2025-03-06 VITALS — HEIGHT: 70 IN | WEIGHT: 306.19 LBS | BODY MASS INDEX: 43.84 KG/M2

## 2025-03-06 DIAGNOSIS — B07.0 PLANTAR WART: Primary | ICD-10-CM

## 2025-03-06 DIAGNOSIS — M79.672 LEFT FOOT PAIN: ICD-10-CM

## 2025-03-06 PROCEDURE — 99499 UNLISTED E&M SERVICE: CPT | Mod: S$GLB,,, | Performed by: PODIATRIST

## 2025-03-06 PROCEDURE — 17110 DESTRUCTION B9 LES UP TO 14: CPT | Mod: S$GLB,,, | Performed by: PODIATRIST

## 2025-03-06 NOTE — PROGRESS NOTES
Subjective:     Patient ID: Julian Enciso is a 69 y.o. male    Chief Complaint: Johnny Jordan is a 69 y.o. male who presents to the podiatry clinic  with complaint of  left foot pain. Onset of the symptoms was several months ago. Precipitating event: none known. Current symptoms include: ability to bear weight, but with some pain and worsening symptoms after a period of activity. Aggravating factors: standing and walking. Symptoms have gradually improved. Patient has had prior foot problems. Evaluation to date: none. Treatment to date:  debridement and topical acid treatment . Patients rates pain 1/10 on pain scale.    Review of Systems   Constitutional: Negative.  Negative for chills and fever.   Respiratory:  Negative for cough and shortness of breath.    Cardiovascular:  Negative for chest pain and leg swelling.   Gastrointestinal:  Negative for diarrhea, nausea and vomiting.   Neurological:  Negative for tingling.        Objective:   Physical Exam  Vitals reviewed.   Constitutional:       General: He is not in acute distress.     Appearance: Normal appearance. He is not ill-appearing.   HENT:      Head: Normocephalic.      Nose: Nose normal.   Cardiovascular:      Pulses:           Dorsalis pedis pulses are 2+ on the right side and 2+ on the left side.        Posterior tibial pulses are 2+ on the right side and 2+ on the left side.   Pulmonary:      Effort: Pulmonary effort is normal. No respiratory distress.   Skin:     Capillary Refill: Capillary refill takes 2 to 3 seconds.   Neurological:      Mental Status: He is alert and oriented to person, place, and time.   Psychiatric:         Mood and Affect: Mood normal.         Behavior: Behavior normal.         Thought Content: Thought content normal.        Foot Exam    General  General Appearance: appears stated age and healthy   Orientation: alert and oriented to person, place, and time   Affect: appropriate   Gait: unimpaired       Right  Foot/Ankle     Neurovascular  Dorsalis pedis: 2+  Posterior tibial: 2+    Muscle Strength  Ankle dorsiflexion: 5  Ankle plantar flexion: 5  Ankle inversion: 5  Ankle eversion: 5  Great toe extension: 5  Great toe flexion: 5      Left Foot/Ankle      Inspection and Palpation  Swelling: great toe metatarsophalangeal joint   Skin Exam: warts (Left 1st MPJ);     Neurovascular  Dorsalis pedis: 2+  Posterior tibial: 2+    Muscle Strength  Ankle dorsiflexion: 5  Ankle plantar flexion: 5  Ankle inversion: 5  Ankle eversion: 5  Great toe extension: 5  Great toe flexion: 5           Assessment:         1. Plantar wart    2. Left foot pain       Plan:     Julian Enciso was seen today for   Chief Complaint   Patient presents with    Callouses       Assessment & Plan           Destruction of Benign Lesion    Date/Time: 3/6/2025 8:45 AM    Performed by: Viry Anthony DPM  Authorized by: Viry Anthony DPM    Consent Done?:  Yes (Verbal)  Location:     Lower Extremity:  Foot and toe    Detail:  Left big toe  Procedure Details:     Cosmetic?: No      Number of lesions:  1    Destruction Method: Debridement with 15 blade with salicylic acid placed under occlusion.    Sterile dressings:  Gauze    Bleeding:  Minimal    Hemostasis Achieved: Yes      Method Used:  Pressure     Patient tolerated the procedure well with no immediate complications.     Post-operative instructions were provided for the patient.     Patient was discharged and will follow up for wound check.       1. Patient was examined and evaluated.    2. Patient made aware continued presence but seemingly improvement of wart to the plantar left 1st MPJ.  Discussed with patient again potential for surgical intervention if the slow pace of the chemical destruction of the lesion is causing extra anxiety.  Patient continues do agree with chemical debridement with topical salicylic acid treatments.  3. Patient will follow-up in 2-3 weeks or p.r.n. for  complaints      Jenny Anthony, LETY  [address] [address] [place] [address]  [phone]      This note was created using [employer] direct voice recognition software. Note may have occasional typographical errors that may not have been identified and edited despite initial review prior to signing.

## 2025-03-10 ENCOUNTER — PATIENT MESSAGE (OUTPATIENT)
Dept: INTERNAL MEDICINE | Facility: CLINIC | Age: 70
End: 2025-03-10
Payer: MEDICARE

## 2025-03-10 NOTE — PROGRESS NOTES
Subjective:     Patient ID: Julian Enciso is a 69 y.o. male    Chief Complaint: Plantar Warts       Julian is a 69 y.o. male who presents to the podiatry clinic  with complaint of  left foot pain. Onset of the symptoms was several years ago. Precipitating event: increased activity and continued presence in growth of plantar verruca plantar left 1st MPJ . Current symptoms include: worsening symptoms after a period of activity. Aggravating factors:  Barefoot walking . Symptoms have gradually improved. Patient has had prior foot problems. Evaluation to date: none. Treatment to date:  Attempted freezing of the lesion and routine debridement of the lesion with application of salicylic acid with some improvement in his size of the lesion . Patients rates pain 0/10 on pain scale.    Review of Systems   Constitutional: Negative.  Negative for chills and fever.   Respiratory:  Negative for cough and shortness of breath.    Cardiovascular:  Negative for chest pain and leg swelling.   Gastrointestinal:  Negative for diarrhea, nausea and vomiting.   Neurological:  Positive for tingling.        Objective:   Physical Exam  Vitals reviewed.   Constitutional:       General: He is not in acute distress.     Appearance: Normal appearance. He is not ill-appearing.   HENT:      Head: Normocephalic.      Nose: Nose normal.   Cardiovascular:      Pulses:           Dorsalis pedis pulses are 2+ on the right side and 2+ on the left side.        Posterior tibial pulses are 2+ on the right side and 2+ on the left side.   Pulmonary:      Effort: Pulmonary effort is normal. No respiratory distress.   Musculoskeletal:      Right foot: No bunion.      Left foot: No bunion.   Feet:      Right foot:      Skin integrity: Callus (plantar right 1st MPJ) present.   Skin:     Capillary Refill: Capillary refill takes 2 to 3 seconds.   Neurological:      Mental Status: He is alert and oriented to person, place, and time.   Psychiatric:          Mood and Affect: Mood normal.         Behavior: Behavior normal.         Thought Content: Thought content normal.        Foot Exam    General  General Appearance: appears stated age and healthy   Orientation: alert and oriented to person, place, and time   Affect: appropriate   Gait: unimpaired       Right Foot/Ankle     Inspection and Palpation  Hallux valgus: no  Skin Exam: callus (plantar right 1st MPJ);     Neurovascular  Dorsalis pedis: 2+  Posterior tibial: 2+    Muscle Strength  Ankle dorsiflexion: 5  Ankle plantar flexion: 5  Ankle inversion: 5  Ankle eversion: 5  Great toe extension: 5  Great toe flexion: 5      Left Foot/Ankle      Inspection and Palpation  Hallux valgus: no  Skin Exam: warts (plantar left 1st MPJ);     Neurovascular  Dorsalis pedis: 2+  Posterior tibial: 2+    Muscle Strength  Ankle dorsiflexion: 5  Ankle plantar flexion: 5  Ankle inversion: 5  Ankle eversion: 5  Great toe extension: 5  Great toe flexion: 5           Assessment:         1. Plantar wart       Plan:     Julian Singhdee deemariya was seen today for   Chief Complaint   Patient presents with    Plantar Warts       Assessment & Plan           Destruction of Benign Lesion    Date/Time: 2/19/2025 11:00 AM    Performed by: Viry Anthony DPM  Authorized by: Viry Anthony DPM    Consent Done?:  Yes (Verbal)  Indications:     Destruction Indications: Plantar wart.  Location:     Lower Extremity:  Foot and toe    Detail:  Left foot    Detail:  Left big toe  Procedure Details:     Cosmetic?: No      Number of lesions:  1    Destruction Method: Sharp debridement with 15. Blade with salicylic acid placed under occlusion.     Patient tolerated the procedure well with no immediate complications.     Post-operative instructions were provided for the patient.     Patient was discharged and will follow up for wound check.       1. Patient was examined and evaluated.    2. Discussed with patient continued presence of wart plantar  left 1st MPJ but improved in size.  Patient made aware that we will continue with non aggressive treatment with debridement with salicylic acid under occlusion.  Discussed with patient potential surgical intervention for total excision of lesion.  Discussed difficulties of procedure with the patient including closing the area after removal of the lesion secondary to large size and location of the lesion.  3. Discussed with patient etiology of callus formation.  Patient was warned of potential recurrence over time.  Patient was dispensed offloading pads for reduction of direct contact to the lesion.  Patient was advised to perform safe debridement at home with a emery board, nail file or pumice stone.  Patient was advised to apply ointments / moisturizer to the area for softening purposes.  4. Patient will follow-up in 2-3 weeks or p.r.n. for complaints       Jenny Anthony DPM  [address] [place] [address]  [phone]      This note was created using [employer] direct voice recognition software. Note may have occasional typographical errors that may not have been identified and edited despite initial review prior to signing.

## 2025-03-20 ENCOUNTER — OFFICE VISIT (OUTPATIENT)
Dept: PODIATRY | Facility: CLINIC | Age: 70
End: 2025-03-20
Payer: MEDICARE

## 2025-03-20 VITALS — WEIGHT: 305 LBS | BODY MASS INDEX: 43.67 KG/M2 | HEIGHT: 70 IN

## 2025-03-20 DIAGNOSIS — R26.2 DIFFICULTY WALKING: ICD-10-CM

## 2025-03-20 DIAGNOSIS — M79.672 LEFT FOOT PAIN: ICD-10-CM

## 2025-03-20 DIAGNOSIS — B07.0 PLANTAR WART: Primary | ICD-10-CM

## 2025-03-20 PROCEDURE — 17110 DESTRUCTION B9 LES UP TO 14: CPT | Mod: S$GLB,,, | Performed by: PODIATRIST

## 2025-03-20 PROCEDURE — 99499 UNLISTED E&M SERVICE: CPT | Mod: S$GLB,,, | Performed by: PODIATRIST

## 2025-04-03 ENCOUNTER — OFFICE VISIT (OUTPATIENT)
Dept: PODIATRY | Facility: CLINIC | Age: 70
End: 2025-04-03
Payer: MEDICARE

## 2025-04-03 VITALS — BODY MASS INDEX: 44.09 KG/M2 | HEIGHT: 70 IN | WEIGHT: 308 LBS

## 2025-04-03 DIAGNOSIS — R26.2 DIFFICULTY WALKING: ICD-10-CM

## 2025-04-03 DIAGNOSIS — M79.672 LEFT FOOT PAIN: ICD-10-CM

## 2025-04-03 DIAGNOSIS — B07.0 PLANTAR WART: Primary | ICD-10-CM

## 2025-04-03 NOTE — PROGRESS NOTES
Subjective:     Patient ID: Julian Enciso is a 69 y.o. male    Chief Complaint: Plantar Warts       Julian is a 69 y.o. male who presents to the podiatry clinic  with complaint of  left foot pain. Onset of the symptoms was several years ago. Precipitating event: none known and growth of soft tissue lesion left 1st MPJ . Current symptoms include: worsening symptoms after a period of activity. Aggravating factors: walking. Symptoms have gradually improved. Patient has had prior foot problems. Treatment to date:  Prior attempted destruction of lesion with debridement with 12. Blade followed by salicylic acid placed under occlusion . Patients rates pain 0/10 on pain scale.    Review of Systems   Constitutional: Negative.  Negative for chills and fever.   Respiratory:  Negative for cough and shortness of breath.    Cardiovascular:  Negative for chest pain and leg swelling.   Gastrointestinal:  Negative for diarrhea, nausea and vomiting.   Neurological:  Positive for tingling.        Objective:   Physical Exam  Vitals reviewed.   Constitutional:       General: He is not in acute distress.     Appearance: Normal appearance. He is not ill-appearing.   HENT:      Head: Normocephalic.      Nose: Nose normal.   Cardiovascular:      Pulses:           Dorsalis pedis pulses are 2+ on the right side and 2+ on the left side.        Posterior tibial pulses are 2+ on the right side and 2+ on the left side.   Pulmonary:      Effort: Pulmonary effort is normal. No respiratory distress.   Musculoskeletal:      Right foot: No bunion.      Left foot: No bunion.   Feet:      Right foot:      Skin integrity: Right foot callus: right 1st digit and right 1st MPJ.   Skin:     Capillary Refill: Capillary refill takes 2 to 3 seconds.   Neurological:      Mental Status: He is alert and oriented to person, place, and time.   Psychiatric:         Mood and Affect: Mood normal.         Behavior: Behavior normal.         Thought Content:  Thought content normal.        Foot Exam    General  General Appearance: appears stated age and healthy   Orientation: alert and oriented to person, place, and time   Affect: appropriate   Gait: unimpaired       Right Foot/Ankle     Inspection and Palpation  Ecchymosis: none  Tenderness: none   Swelling: none   Arch: normal  Hammertoes: absent  Claw Toes: absent  Hallux valgus: no  Hallux limitus: no  Skin Exam: skin intact; Right foot callus: right 1st digit and right 1st MPJ.    Neurovascular  Dorsalis pedis: 2+  Posterior tibial: 2+    Muscle Strength  Ankle dorsiflexion: 5  Ankle plantar flexion: 5  Ankle inversion: 5  Ankle eversion: 5  Great toe extension: 5  Great toe flexion: 5      Left Foot/Ankle      Inspection and Palpation  Ecchymosis: none  Tenderness: none   Swelling: none   Arch: normal  Hammertoes: absent  Claw toes: absent  Hallux valgus: no  Hallux limitus: no  Skin Exam: skin intact; Left foot warts: Plantar left 1st MPJ.    Neurovascular  Dorsalis pedis: 2+  Posterior tibial: 2+    Muscle Strength  Ankle dorsiflexion: 5  Ankle plantar flexion: 5  Ankle inversion: 5  Ankle eversion: 5  Great toe extension: 5  Great toe flexion: 5           Assessment:         1. Plantar wart    2. Difficulty walking    3. Left foot pain       Plan:     Julian Lopez Beronicas was seen today for   Chief Complaint   Patient presents with    Plantar Warts       Assessment & Plan           Destruction of Benign Lesion    Date/Time: 4/3/2025 9:30 AM    Performed by: Viry Anthony DPM  Authorized by: Viry Anthony DPM    Consent Done?:  Yes (Verbal)  Indications:     Destruction Indications: plantar verrucae.  Location:     Lower Extremity:  Foot (left 1st MPJ)    Detail:  Left foot  Procedure Details:     Cosmetic?: No      Number of lesions:  1    Destruction Method: Sharp debridement with 15 blade with salicylic acid placed under occlusion.    Bleeding:  Minimal    Hemostasis Achieved: Yes      Method  Used:  Pressure     Patient tolerated the procedure well with no immediate complications.     Post-operative instructions were provided for the patient.     Patient was discharged and will follow up for wound check.       1. Patient was examined and evaluated.    2. Patient made aware of improvement of appearance and size of wart to the plantar left 1st MPJ with prior attempted destruction of lesion.  Patient was advised that we may need to continue treatment in a similar fashion.  Briefly discussed with patient again surgical excision of the lesion.  Patient will continue with use of offloading pad to the area.    3. Patient was made aware of aggressive callus formation to the left 1st digit and left 1st MPJ. Discussed with patient etiology of callus formation.  Patient was warned of potential recurrence over time.  Patient was dispensed offloading pads for reduction of direct contact to the lesion.  Patient was advised to perform safe debridement at home with a emery board, nail file or pumice stone.  Patient was advised to apply ointments / moisturizer to the area for softening purposes.  4. Patient will follow-up in 2-3 weeks or p.r.n. for complaints         Jenny Anthony DPM  [address] [address] [id number] [phone]      This note was created using [employer] direct voice recognition software. Note may have occasional typographical errors that may not have been identified and edited despite initial review prior to signing.

## 2025-04-14 NOTE — PROGRESS NOTES
Subjective:     Patient ID: Julian Enciso is a 69 y.o. male    Chief Complaint: Plantar Warts       Julian is a 69 y.o. male who presents to the podiatry clinic  with complaint of  left foot pain. Onset of the symptoms was several years ago. Precipitating event: none known and growth of soft tissue lesion left 1st MPJ . Current symptoms include: worsening symptoms after a period of activity. Aggravating factors: walking. Symptoms have gradually improved. Patient has had prior foot problems. Treatment to date:  Prior attempted destruction of lesion with debridement with 12. Blade followed by salicylic acid placed under occlusion . Patients rates pain 0/10 on pain scale     Review of Systems   Constitutional: Negative.  Negative for chills and fever.   Respiratory:  Negative for cough and shortness of breath.    Cardiovascular:  Negative for chest pain and leg swelling.   Gastrointestinal:  Negative for diarrhea, nausea and vomiting.   Neurological:  Negative for tingling.        Objective:   Physical Exam  Vitals reviewed.   Constitutional:       General: He is not in acute distress.     Appearance: Normal appearance. He is not ill-appearing.   HENT:      Head: Normocephalic.      Nose: Nose normal.   Cardiovascular:      Pulses:           Dorsalis pedis pulses are 2+ on the right side and 2+ on the left side.        Posterior tibial pulses are 2+ on the right side and 2+ on the left side.   Pulmonary:      Effort: Pulmonary effort is normal. No respiratory distress.   Feet:      Right foot:      Skin integrity: Callus (right 1st MPJ) present.   Skin:     Capillary Refill: Capillary refill takes 2 to 3 seconds.   Neurological:      Mental Status: He is alert and oriented to person, place, and time.   Psychiatric:         Mood and Affect: Mood normal.         Behavior: Behavior normal.         Thought Content: Thought content normal.        Foot Exam    General  General Appearance: appears stated age and  healthy   Orientation: alert and oriented to person, place, and time   Affect: appropriate   Gait: unimpaired       Right Foot/Ankle     Inspection and Palpation  Skin Exam: callus (right 1st MPJ);     Neurovascular  Dorsalis pedis: 2+  Posterior tibial: 2+    Muscle Strength  Ankle dorsiflexion: 5  Ankle plantar flexion: 5  Ankle inversion: 5  Ankle eversion: 5  Great toe extension: 5  Great toe flexion: 5      Left Foot/Ankle      Inspection and Palpation  Skin Exam: warts (left 1st MPJ-improved from prior office visit);     Neurovascular  Dorsalis pedis: 2+  Posterior tibial: 2+    Muscle Strength  Ankle dorsiflexion: 5  Ankle plantar flexion: 5  Ankle inversion: 5  Ankle eversion: 5  Great toe extension: 5  Great toe flexion: 5           Assessment:         1. Plantar wart    2. Left foot pain    3. Difficulty walking       Plan:     Julian Lopez Zaria was seen today for   Chief Complaint   Patient presents with    Plantar Warts       Assessment & Plan           Destruction of Benign Lesion    Date/Time: 3/20/2025 9:15 AM    Performed by: Viry Anthony DPM  Authorized by: Viry Anthony DPM    Consent Done?:  Yes (Verbal)  Indications:     Destruction Indications: Plantar wart.  Location:     Lower Extremity:  Toe and foot (Left 1st MPJ)    Detail:  Left foot    Detail:  Left big toe  Procedure Details:     Cosmetic?: No      Number of lesions:  1    Destruction Method: Sharp debridement with 12 blade with salicylic acid placed under occlusion.    Sterile dressings:  Gauze    Bleeding:  None     Patient tolerated the procedure well with no immediate complications.     Post-operative instructions were provided for the patient.     Patient was discharged and will follow up for wound check.       1. Patient was examined and evaluated.    2. Patient made aware continued presence of wart plantar left 1st MPJ.  Patient made aware that the areas seems to be decreasing in overall size and appearance.   Patient made aware of potential benefits of continued attempted destruction of the lesion via topical salicylic acid treatments after debridement.  Patient will keep 2 days dressing intact for 2-3 days and continue with covering of the wart home following removable.  3. Patient will follow-up in 2-3 weeks or p.r.n. for complaints      Jenny Anthony DPM  [address] [place] [address]  [phone]      This note was created using [employer] direct voice recognition software. Note may have occasional typographical errors that may not have been identified and edited despite initial review prior to signing.

## 2025-04-17 ENCOUNTER — OFFICE VISIT (OUTPATIENT)
Dept: PODIATRY | Facility: CLINIC | Age: 70
End: 2025-04-17
Payer: MEDICARE

## 2025-04-17 VITALS — HEIGHT: 70 IN | BODY MASS INDEX: 44.12 KG/M2 | WEIGHT: 308.19 LBS

## 2025-04-17 DIAGNOSIS — R26.2 DIFFICULTY WALKING: ICD-10-CM

## 2025-04-17 DIAGNOSIS — B07.0 PLANTAR WART: Primary | ICD-10-CM

## 2025-04-17 DIAGNOSIS — M79.672 LEFT FOOT PAIN: ICD-10-CM

## 2025-04-17 PROCEDURE — 17110 DESTRUCTION B9 LES UP TO 14: CPT | Mod: S$GLB,,, | Performed by: PODIATRIST

## 2025-04-17 PROCEDURE — 99499 UNLISTED E&M SERVICE: CPT | Mod: S$GLB,,, | Performed by: PODIATRIST

## 2025-04-17 NOTE — PROGRESS NOTES
Subjective:     Patient ID: Julian Enciso is a 69 y.o. male    Chief Complaint: Plantar Warts       Julian is a 69 y.o. male who presents to the podiatry clinic  with complaint of  left foot pain. Onset of the symptoms was several years ago. Precipitating event:  Growth of soft tissue lesion plantar left 1st MPJ . Current symptoms include: worsening symptoms after a period of activity. Aggravating factors:  Direct contact to plantar verruca left 1st MPJ . Symptoms have gradually improved. Patient has had prior foot problems. Evaluation to date: none. Treatment to date:  Attempted destruction of lesion with debridement and salicylic acid placed under occlusion . Patients rates pain 0/10 on pain scale.    Review of Systems   Constitutional:  Positive for malaise/fatigue. Negative for chills and fever.   Respiratory:  Negative for cough and shortness of breath.    Cardiovascular:  Negative for chest pain and leg swelling.   Gastrointestinal:  Negative for diarrhea, nausea and vomiting.   Neurological:  Negative for tingling.        Objective:   Physical Exam  Vitals reviewed.   Constitutional:       General: He is not in acute distress.     Appearance: Normal appearance. He is not ill-appearing.   HENT:      Head: Normocephalic.      Nose: Nose normal.   Cardiovascular:      Pulses:           Dorsalis pedis pulses are 2+ on the right side and 2+ on the left side.        Posterior tibial pulses are 2+ on the right side and 2+ on the left side.   Pulmonary:      Effort: Pulmonary effort is normal. No respiratory distress.   Musculoskeletal:      Right foot: No bunion.      Left foot: No bunion.   Feet:      Right foot:      Skin integrity: Callus (plantar right 1st MPJ) present.   Skin:     Capillary Refill: Capillary refill takes 2 to 3 seconds.   Neurological:      Mental Status: He is alert and oriented to person, place, and time.   Psychiatric:         Mood and Affect: Mood normal.         Behavior:  Behavior normal.         Thought Content: Thought content normal.        Foot Exam    General  General Appearance: appears stated age and healthy   Orientation: alert and oriented to person, place, and time   Affect: appropriate   Gait: unimpaired       Right Foot/Ankle     Inspection and Palpation  Ecchymosis: none  Tenderness: none   Arch: normal  Hallux valgus: no  Skin Exam: callus (plantar right 1st MPJ);     Neurovascular  Dorsalis pedis: 2+  Posterior tibial: 2+    Muscle Strength  Ankle dorsiflexion: 5  Ankle plantar flexion: 5  Ankle inversion: 5  Ankle eversion: 5  Great toe extension: 5  Great toe flexion: 5      Left Foot/Ankle      Inspection and Palpation  Ecchymosis: none  Tenderness: none   Arch: normal  Hallux valgus: no  Skin Exam: warts (Plantar left 1st MPJ);     Neurovascular  Dorsalis pedis: 2+  Posterior tibial: 2+    Muscle Strength  Ankle dorsiflexion: 5  Ankle plantar flexion: 5  Ankle inversion: 5  Ankle eversion: 5  Great toe extension: 5  Great toe flexion: 5           Assessment:         1. Plantar wart    2. Difficulty walking    3. Left foot pain       Plan:     Julian Lopez Beronicas was seen today for   Chief Complaint   Patient presents with    Plantar Warts       Assessment & Plan           Destruction of Benign Lesion    Date/Time: 4/17/2025 8:45 AM    Performed by: Viry Anthony DPM  Authorized by: Viry Anthony DPM    Consent Done?:  Yes (Verbal)  Indications:     Destruction Indications: Plantar verruca.  Location:     Lower Extremity:  Foot (Left 1st MPJ plantar medial)    Detail:  Left foot  Procedure Details:     Cosmetic?: No      Number of lesions:  1    Destruction Method: Sharp debridement with 15 blade with salicylic acid placed under occlusion.    Bleeding:  Minimal    Hemostasis Achieved: Yes      Method Used:  Pressure     Patient tolerated the procedure well with no immediate complications.     Post-operative instructions were provided for the  patient.     Patient was discharged and will follow up for wound check.       1. Patient was examined and evaluated.    2. Patient made aware of improvement but continued presence of left foot plantar verruca at the 1st MPJ with mixed hyperkeratotic skin in the areas well. Discussed with patient etiology of plantar verruca.  Discussed with patient several conservative treatment options for verruca including cryotherapy, surgical excision, cauterization and debridement with topical acid treatments.  Patient was warned of potential recurrence over time.  Patient was dispensed offloading aperture pads for prevention of direct contact.    3. Patient will follow-up in 2-3 weeks or p.r.n. for complaints      Jenny Anthony DPM  [address] [place] [address]  [phone]      This note was created using [employer] direct voice recognition software. Note may have occasional typographical errors that may not have been identified and edited despite initial review prior to signing.

## 2025-05-01 ENCOUNTER — OFFICE VISIT (OUTPATIENT)
Dept: PODIATRY | Facility: CLINIC | Age: 70
End: 2025-05-01
Payer: MEDICARE

## 2025-05-01 VITALS — WEIGHT: 306.81 LBS | BODY MASS INDEX: 43.92 KG/M2 | HEIGHT: 70 IN

## 2025-05-01 DIAGNOSIS — B07.0 PLANTAR WART: Primary | ICD-10-CM

## 2025-05-01 DIAGNOSIS — R26.2 DIFFICULTY WALKING: ICD-10-CM

## 2025-05-01 PROCEDURE — 17110 DESTRUCTION B9 LES UP TO 14: CPT | Mod: S$GLB,,, | Performed by: PODIATRIST

## 2025-05-01 PROCEDURE — 99499 UNLISTED E&M SERVICE: CPT | Mod: S$GLB,,, | Performed by: PODIATRIST

## 2025-05-01 RX ORDER — ONDANSETRON 4 MG/1
4 TABLET, ORALLY DISINTEGRATING ORAL EVERY 6 HOURS PRN
COMMUNITY
Start: 2025-04-22

## 2025-05-19 NOTE — PROGRESS NOTES
Subjective:     Patient ID: Julian Enciso is a 70 y.o. male    Chief Complaint: Plantar Warts       Julian is a 70 y.o. male who presents to the podiatry clinic  with complaint of  left foot pain. Onset of the symptoms was several years ago. Precipitating event: none known. Current symptoms include: Continued growth of callus and plantar verruca to the plantar left 1st MPJ. Aggravating factors: None. Symptoms have waxed and waned but are better overall. Patient has had prior foot problems. Evaluation to date: Prior biopsy of the lesion which reveal plantar verruca. Treatment to date: Multiple attempts at destruction of lesion width sharp debridement and application salicylic acid under occlusion. Patients rates pain 0/10 on pain scale.    Review of Systems   Constitutional: Negative.  Negative for chills and fever.   Respiratory:  Negative for cough and shortness of breath.    Cardiovascular:  Negative for chest pain and leg swelling.   Gastrointestinal:  Negative for diarrhea, nausea and vomiting.   Neurological:  Negative for tingling.        Objective:   Physical Exam  Vitals reviewed.   Constitutional:       General: He is not in acute distress.     Appearance: Normal appearance. He is not ill-appearing.   HENT:      Head: Normocephalic.      Nose: Nose normal.   Cardiovascular:      Pulses:           Dorsalis pedis pulses are 2+ on the right side and 2+ on the left side.        Posterior tibial pulses are 2+ on the right side and 2+ on the left side.   Pulmonary:      Effort: Pulmonary effort is normal. No respiratory distress.   Skin:     Capillary Refill: Capillary refill takes 2 to 3 seconds.   Neurological:      Mental Status: He is alert and oriented to person, place, and time.   Psychiatric:         Mood and Affect: Mood normal.         Behavior: Behavior normal.         Thought Content: Thought content normal.        Foot Exam    General  General Appearance: appears stated age and healthy    Orientation: alert and oriented to person, place, and time   Affect: appropriate   Gait: unimpaired       Right Foot/Ankle     Inspection and Palpation  Ecchymosis: none  Tenderness: none   Swelling: none     Neurovascular  Dorsalis pedis: 2+  Posterior tibial: 2+    Muscle Strength  Ankle dorsiflexion: 5  Ankle plantar flexion: 5  Ankle inversion: 5  Ankle eversion: 5  Great toe extension: 5  Great toe flexion: 5      Left Foot/Ankle      Inspection and Palpation  Ecchymosis: third toe  Tenderness: none   Swelling: none   Skin Exam: warts (Plantar left 1st MPJ);     Neurovascular  Dorsalis pedis: 2+  Posterior tibial: 2+    Muscle Strength  Ankle dorsiflexion: 5  Ankle plantar flexion: 5  Ankle inversion: 5  Ankle eversion: 5  Great toe extension: 5  Great toe flexion: 5           Assessment:         1. Plantar wart    2. Difficulty walking       Plan:     Julian Lopez Zaria was seen today for   Chief Complaint   Patient presents with    Plantar Warts       Assessment & Plan           Destruction of Benign Lesion    Date/Time: 5/1/2025 11:30 AM    Performed by: Viry Anthony DPM  Authorized by: Viry Anthony DPM    Consent Done?:  Yes (Verbal)  Indications:     Destruction Indications: Plantar verruca.  Location:     Lower Extremity:  Toe    Detail:  Left big toe  Procedure Details:     Cosmetic?: No      Number of lesions:  1    Destruction Method: Sharp debridement with 15 blade with salicylic acid placed under occlusion.     Patient tolerated the procedure well with no immediate complications.     Post-operative instructions were provided for the patient.     Patient was discharged and will follow up for wound check.       1. Patient was examined and evaluated.    2. Patient made aware of improved size and appearance of verruca plantar left 1st MPJ but still some presence.  Patient made aware that area seems to be very aggressive at forming new callus as well as warty tissue.  Discussed with  patient potential benefits of further noninvasive conservative therapy such as debridement with application of salicylic acid under occlusion.  Patient was advised to leave current dressing intact for 2-3 days.  Patient was dispensed pads for continued offloading of the area following with removal.  Patient will continue to monitor area for progression of symptoms.  Patient was advised to decrease overgrown ambulation reduce the amount of barefoot walking.    3. Patient will follow-up in 3-4 weeks or p.r.n. for complaints      Jenny Anthony DPM  [address] [place] [address]  [phone]      This note was created using [employer] direct voice recognition software. Note may have occasional typographical errors that may not have been identified and edited despite initial review prior to signing.

## 2025-05-28 ENCOUNTER — OFFICE VISIT (OUTPATIENT)
Dept: PODIATRY | Facility: CLINIC | Age: 70
End: 2025-05-28
Payer: MEDICARE

## 2025-05-28 VITALS — HEIGHT: 70 IN | BODY MASS INDEX: 44.02 KG/M2

## 2025-05-28 DIAGNOSIS — M79.672 LEFT FOOT PAIN: ICD-10-CM

## 2025-05-28 DIAGNOSIS — R26.2 DIFFICULTY WALKING: ICD-10-CM

## 2025-05-28 DIAGNOSIS — B07.0 PLANTAR WART: Primary | ICD-10-CM

## 2025-05-28 PROCEDURE — 99499 UNLISTED E&M SERVICE: CPT | Mod: S$GLB,,, | Performed by: PODIATRIST

## 2025-05-28 PROCEDURE — 17110 DESTRUCTION B9 LES UP TO 14: CPT | Mod: S$GLB,,, | Performed by: PODIATRIST

## 2025-05-28 NOTE — PROGRESS NOTES
Subjective:     Patient ID: Julian Enciso is a 70 y.o. male    Chief Complaint: Plantar Warts       Julian is a 70 y.o. male who presents to the podiatry clinic  with complaint of  left foot pain. Onset of the symptoms was several years ago. Precipitating event: increased activity and growth of soft tissue lesion plantar left 1st MPJ. Current symptoms include: Dry skin. Aggravating factors: Barefoot walking. Symptoms have gradually improved. Patient has had prior foot problems. Evaluation to date: Culture of the lesion revealed plantar verruca. Treatment to date: Prior attempts at cryotherapy in multiple sharp debridements with salicylic acid placed under occlusion with noted improvement. Patients rates pain 0/10 on pain scale.    Review of Systems   Constitutional: Negative.  Negative for chills and fever.   Respiratory:  Negative for cough and shortness of breath.    Cardiovascular:  Negative for chest pain and leg swelling.   Gastrointestinal:  Negative for diarrhea, nausea and vomiting.   Neurological:  Negative for tingling.        Objective:   Physical Exam  Vitals reviewed.   Constitutional:       General: He is not in acute distress.     Appearance: Normal appearance. He is not ill-appearing.   HENT:      Head: Normocephalic.      Nose: Nose normal.   Cardiovascular:      Pulses:           Dorsalis pedis pulses are 2+ on the right side and 2+ on the left side.        Posterior tibial pulses are 2+ on the right side and 2+ on the left side.   Pulmonary:      Effort: Pulmonary effort is normal. No respiratory distress.   Feet:      Left foot:      Skin integrity: Callus (plantar left 1st MPJ) present.   Skin:     Capillary Refill: Capillary refill takes 2 to 3 seconds.   Neurological:      Mental Status: He is alert and oriented to person, place, and time.   Psychiatric:         Mood and Affect: Mood normal.         Behavior: Behavior normal.         Thought Content: Thought content normal.       Foot Exam    General  General Appearance: appears stated age and healthy   Orientation: alert and oriented to person, place, and time   Affect: appropriate   Gait: unimpaired       Right Foot/Ankle     Inspection and Palpation  Tenderness: none     Neurovascular  Dorsalis pedis: 2+  Posterior tibial: 2+    Muscle Strength  Ankle dorsiflexion: 5  Ankle plantar flexion: 5  Ankle inversion: 5  Ankle eversion: 5  Great toe extension: 5  Great toe flexion: 5      Left Foot/Ankle      Inspection and Palpation  Tenderness: none   Skin Exam: callus (plantar left 1st MPJ) and warts (plantar left 1st MPJ);     Neurovascular  Dorsalis pedis: 2+  Posterior tibial: 2+    Muscle Strength  Ankle dorsiflexion: 5  Ankle plantar flexion: 5  Ankle inversion: 5  Ankle eversion: 5  Great toe extension: 5  Great toe flexion: 5         Assessment:         1. Plantar wart    2. Difficulty walking    3. Left foot pain       Plan:     Julian Lopez Davidmariya was seen today for   Chief Complaint   Patient presents with    Plantar Warts       Assessment & Plan           Destruction of Benign Lesion    Date/Time: 5/28/2025 8:45 AM    Performed by: Viry Anthony DPM  Authorized by: Viry Anthony DPM    Consent Done?:  Yes (Verbal)  Indications:     Destruction Indications: Plantar verruca.  Location:     Lower Extremity:  Foot (Plantar left 1st MPJ)  Procedure Details:     Cosmetic?: No      Number of lesions:  1    Destruction Method: Sharp debridement with 15 blade with salicylic acid placed under occlusion.    Bleeding:  None     Patient tolerated the procedure well with no immediate complications.     Post-operative instructions were provided for the patient.     Patient was discharged and will follow up for wound check.       1. Patient was examined and evaluated.    2. Discussed with patient ongoing continued complaints of plantar verruca.  Patient made aware that the warty component of the skin in the area seems to be at  a minimal.  Patient made aware that he still had some callus development of the area.  Patient made aware that it is similar to callus development on the opposite foot plantar 1st MPJ.  Patient was advised to continue with the occasional safe debridement at home with a emery board nail file.  Patient will reduced overall amount of barefoot walking and use of slides and flip-flops.  3. Discussed with patient noted improvement of the area.  Patient made aware that we may not have complete resolution of this area over time.  Patient made aware that we will increase the time in between the appointment  4. Patient will follow-up in 6 weeks or p.r.n. for complaints       Jenny Anthony DPM  [address] [place] [address]  [phone]      This note was created using [employer] direct voice recognition software. Note may have occasional typographical errors that may not have been identified and edited despite initial review prior to signing.

## 2025-07-09 ENCOUNTER — OFFICE VISIT (OUTPATIENT)
Dept: PODIATRY | Facility: CLINIC | Age: 70
End: 2025-07-09
Payer: MEDICARE

## 2025-07-09 VITALS — HEIGHT: 70 IN | BODY MASS INDEX: 43.18 KG/M2 | WEIGHT: 301.63 LBS

## 2025-07-09 DIAGNOSIS — M79.672 LEFT FOOT PAIN: ICD-10-CM

## 2025-07-09 DIAGNOSIS — R26.2 DIFFICULTY WALKING: ICD-10-CM

## 2025-07-09 DIAGNOSIS — B07.0 PLANTAR WART: Primary | ICD-10-CM

## 2025-07-09 DIAGNOSIS — L84 CORN OR CALLUS: ICD-10-CM

## 2025-07-09 PROCEDURE — 99499 UNLISTED E&M SERVICE: CPT | Mod: S$GLB,,, | Performed by: PODIATRIST

## 2025-07-09 PROCEDURE — 17110 DESTRUCTION B9 LES UP TO 14: CPT | Mod: S$GLB,,, | Performed by: PODIATRIST

## 2025-07-09 RX ORDER — HYOSCYAMINE SULFATE 0.125 MG
125 TABLET ORAL EVERY 4 HOURS PRN
COMMUNITY
Start: 2025-06-11

## 2025-07-09 RX ORDER — TRAMADOL HYDROCHLORIDE 50 MG/1
50 TABLET, FILM COATED ORAL EVERY 6 HOURS PRN
COMMUNITY
Start: 2025-06-11

## 2025-07-09 NOTE — PROGRESS NOTES
Subjective:     Patient ID: Julian Enciso is a 70 y.o. male    Chief Complaint: Callouses and Plantar Warts       Julian is a 70 y.o. male who presents to the podiatry clinic  with complaint of  bilateral foot pain. Onset of the symptoms was several weeks ago. Precipitating event: increased activity and growth of callus plantar right 1st MPJ in growth of pain full for plantar left 1st MPJ. Current symptoms include: ability to bear weight, but with some pain. Aggravating factors: Barefoot walking. Symptoms have waxed and waned but are better overall. Patient has had prior foot problems. Evaluation to date: none. Treatment to date: Debridement with application of salicylic acid with noted temporary effectiveness. Patients rates pain 1/10 on pain scale.    Review of Systems   Constitutional: Negative.  Negative for chills and fever.   Respiratory:  Negative for cough and shortness of breath.    Cardiovascular:  Negative for chest pain and leg swelling.   Gastrointestinal:  Negative for diarrhea, nausea and vomiting.   Neurological:  Positive for tingling.        Objective:   Physical Exam  Vitals reviewed.   Constitutional:       General: He is not in acute distress.     Appearance: Normal appearance. He is not ill-appearing.   HENT:      Head: Normocephalic.      Nose: Nose normal.   Cardiovascular:      Pulses:           Dorsalis pedis pulses are 2+ on the right side and 2+ on the left side.        Posterior tibial pulses are 2+ on the right side and 2+ on the left side.   Pulmonary:      Effort: Pulmonary effort is normal. No respiratory distress.   Feet:      Right foot:      Skin integrity: Callus (plantar right 1st IPJ and MPJ) present.   Skin:     Capillary Refill: Capillary refill takes 2 to 3 seconds.   Neurological:      Mental Status: He is alert and oriented to person, place, and time.   Psychiatric:         Mood and Affect: Mood normal.         Behavior: Behavior normal.         Thought  Content: Thought content normal.        Foot Exam    General  General Appearance: appears stated age and healthy   Orientation: alert and oriented to person, place, and time   Affect: appropriate   Gait: unimpaired       Right Foot/Ankle     Inspection and Palpation  Tenderness: none   Arch: normal  Skin Exam: callus (plantar right 1st IPJ and MPJ);     Neurovascular  Dorsalis pedis: 2+  Posterior tibial: 2+    Muscle Strength  Ankle dorsiflexion: 5  Ankle plantar flexion: 5  Ankle inversion: 5  Ankle eversion: 5  Great toe extension: 5  Great toe flexion: 5      Left Foot/Ankle      Inspection and Palpation  Tenderness: none   Arch: normal  Skin Exam: warts (plantar medial left First MPJ);     Neurovascular  Dorsalis pedis: 2+  Posterior tibial: 2+    Muscle Strength  Ankle dorsiflexion: 5  Ankle plantar flexion: 5  Ankle inversion: 5  Ankle eversion: 5  Great toe extension: 5  Great toe flexion: 5           Assessment:         1. Plantar wart    2. Difficulty walking    3. Left foot pain    4. Corn or callus       Plan:     Julian Lopez Zaria was seen today for   Chief Complaint   Patient presents with    Callouses    Plantar Warts       Assessment & Plan           Destruction of Benign Lesion    Date/Time: 7/9/2025 8:30 AM    Performed by: Viry Anthony DPM  Authorized by: Viry Anthony DPM    Consent Done?:  Yes (Verbal)  Indications:     Destruction Indications: plantar verrucae.  Location:     Lower Extremity:  Foot (left 1st MPJ)    Detail:  Left foot  Procedure Details:     Cosmetic?: No      Number of lesions:  1    Destruction Method: Sharp debridement with 15 blade with salicylic acid placed under occlusion.     Patient tolerated the procedure well with no immediate complications.     Post-operative instructions were provided for the patient.     Patient was discharged and will follow up for wound check.       1. Patient was examined and evaluated.    2. Discussed with patient continued  presence of verrucous tissue as well as callus to the plantar left 1st MPJ.  Patient was advised that we will continue to try to manage the size of the lesion even if weekend fully destroyed.  Discussed with patient benefits of topical salicylic acid treatments for reduction of size and pain complaints to the area.  Patient had offloading pad fill with salicylic acid covered with a large bandage.  Patient was advised to keep this dressing clean, dry, and intact for 1-3 days.  Patient will continue with offloading pads for reduction of pressure to the area.  3. Patient made aware extensor callus at the plantar right 1st MPJ.  Patient was advised to perform safe debridement at home with a emery board nail file.  Patient will apply ointments/moisturizer for softening purposes.  Patient will decrease the amount of barefoot walking and limit the use of slides backless shoes.  4. Patient will follow-up in 6-8 weeks or p.r.n. foot complaints    Jenny Anthony DPM  [address] [place] [address]  [phone]      This note was created using [employer] direct voice recognition software. Note may have occasional typographical errors that may not have been identified and edited despite initial review prior to signing.

## 2025-08-21 ENCOUNTER — OFFICE VISIT (OUTPATIENT)
Dept: PODIATRY | Facility: CLINIC | Age: 70
End: 2025-08-21
Payer: MEDICARE

## 2025-08-21 VITALS — HEIGHT: 70 IN | WEIGHT: 300.38 LBS | BODY MASS INDEX: 43 KG/M2

## 2025-08-21 DIAGNOSIS — R26.2 DIFFICULTY WALKING: ICD-10-CM

## 2025-08-21 DIAGNOSIS — M79.672 LEFT FOOT PAIN: ICD-10-CM

## 2025-08-21 DIAGNOSIS — B07.0 PLANTAR WART: Primary | ICD-10-CM

## 2025-08-21 PROCEDURE — 99499 UNLISTED E&M SERVICE: CPT | Mod: S$GLB,,, | Performed by: PODIATRIST

## 2025-08-21 PROCEDURE — 17110 DESTRUCTION B9 LES UP TO 14: CPT | Mod: S$GLB,,, | Performed by: PODIATRIST
